# Patient Record
Sex: FEMALE | Race: WHITE
[De-identification: names, ages, dates, MRNs, and addresses within clinical notes are randomized per-mention and may not be internally consistent; named-entity substitution may affect disease eponyms.]

---

## 2017-01-09 NOTE — EDPHY
H & P


Time Seen by Provider: 01/09/17 18:22


HPI/ROS: 


CHIEF COMPLAINT:  Trouble breathing





HISTORY OF PRESENT ILLNESS:  This 36-year-old woman has a history of mast cell 

dysfunction and previous allergic reactions.  She has been feeling unwell since 

last week when she had a friend visit got very excited started getting worse.  

She tells me that sometimes the emotional excitement can make her worse as a 

trigger.  Her Dr. Dinorah José started her on prednisone 3 days ago at 20 mg 

in divided doses.


Today she started feeling bad 4:30 four thirty p.m..  She got malaise 

associated with shortness of breath this abdominal cramping as well as 

sensation of angioedema and feeling flushed under skin.


She got a total of 25 mg oral Benadryl and then 50 mg IV Benadryl by 

paramedics.  At 5:40 a.m. she gave herself her own epinephrine pen because she 

was feeling worse.


Right now she says she feels a little bit flushed but her breathing is better.  

She does not feel entirely back to normal.





REVIEW OF SYSTEMS:


Eye: no change in vision


ENT: no sore throat


Cardiac: no chest pain or syncope


Pulmonary:  HPI


Abdomen:  No vomiting or diarrhea


Musculoskeletal: no back pain


Skin:  Feeling flush but no urticaria


Neuro: no headache


Constitutional: no fever


: no urinary symptoms





A comprehensive 10 point review of systems is otherwise negative aside from 

elements mentioned in the history of present illness.





PAST MEDICAL HISTORY:  Mast cell disease





Social history:  Here with her sister





General Appearance: Alert and conversant, cooperative.


Eyes: No scleral  icterus. 


ENT, Mouth: Normal mucous membranes.  Normal uvula and no visible angioedema.


Respiratory: Normal respiratory effort, breath sounds equal, lungs are clear to 

auscultation. No wheezing.  No stridor, speaks in full sentences.


Cardiovascular:  Regular rate and rhythm.


Gastrointestinal:  Abdomen is soft and non tender.


Neurological: Alert and oriented x3.   Normally conversant. Face symmetric, 

normal movement and sensation in all extremities.


Skin:  Patient states she has some very faint rash on her thighs but I do not 

appreciate any flashing or urticaria.


Musculoskeletal: No peripheral edema and no joint swelling.


Psychiatric: Not agitated.





Emergency Department course/MDM:


Epinephrine and total 75 mg Benadryl prior to arrival.


H2 blocker and methylprednisolone 125 mg IV.  Plan for emergency Department 

observation.





2031:  Feels better, back to normal.  She has had previous rebound but never 

bad enough that she had return to medical care to a hospital.


She lives with family and friends and could return easily if she feels worse.  

She states she feels comfortable being discharged and will be on a increased 

dose of prednisone for the next couple of days and has follow-up with her 

doctor this week on Thursday.





Smoking Status: Never smoked


Constitutional: 


 Initial Vital Signs











Temperature (C)  36.9 C   01/09/17 18:30


 


Heart Rate  125 H  01/09/17 18:30


 


Respiratory Rate  16   01/09/17 18:30


 


Blood Pressure  121/74 H  01/09/17 18:30


 


O2 Sat (%)  93   01/09/17 18:30








 











O2 Delivery Mode               Room Air














Allergies/Adverse Reactions: 


 





acetaminophen [From Vicodin] Allergy (Verified 06/02/16 22:46)


 


aspirin Allergy (Verified 06/02/16 22:46)


 


hydrocodone bitartrate [From Vicodin] Allergy (Verified 06/02/16 22:46)


 


morphine Allergy (Verified 06/02/16 22:46)


 








Home Medications: 














 Medication  Instructions  Recorded


 


Albuterol  01/09/17


 


Mast Cell Stablizer  01/09/17


 


Prednisone  01/09/17


 


Zantac  01/09/17


 


Zyrtec  01/09/17


 


predniSONE [prednisone 20mg (RX)] 40 mg PO DAILY 5 Days 01/09/17














Medical Decision Making


Differential Diagnosis: 


Differential considered including but not limited to food allergy, anaphylaxis, 

mast cell disease, asthma.





- Data Points


Medications Given: 


 








Discontinued Medications





Sodium Chloride (Ns)  1,000 mls @ 0 mls/hr IV ONCE ONE


   PRN Reason: Wide Open


   Stop: 01/09/17 18:34


   Last Admin: 01/09/17 18:40 Dose:  1,000 mls


Sodium Chloride (Ns)  1,000 mls @ 0 mls/hr IV ONCE ONE


   PRN Reason: Wide Open


   Stop: 01/09/17 18:45


   Last Admin: 01/09/17 19:30 Dose:  1,000 mls


Methylprednisolone Sodium Succinate (Solu-Medrol)  125 mg IVP EDNOW ONE


   Stop: 01/09/17 18:34


   Last Admin: 01/09/17 18:40 Dose:  125 mg


Ranitidine HCl (Zantac)  50 mg IVP EDNOW ONE


   Stop: 01/09/17 18:34


   Last Admin: 01/09/17 18:57 Dose:  50 mg








Departure





- Departure


Disposition: Home, Routine, Self-Care


Clinical Impression: 


 Mast cell disease


Condition: Good


Instructions:  Anaphylaxis (ED)


Referrals: 


Dinorah José MD [Medical Doctor] - As per Instructions


Prescriptions: 


predniSONE [prednisone 20mg (RX)] 40 mg PO DAILY 5 Days

## 2017-01-14 NOTE — EDPHY
H & P


Time Seen by Provider: 01/14/17 17:24


HPI/ROS: 


CHIEF COMPLAINT: Mast Cell Disease, Allergic Reaction. 





HISTORY OF PRESENT ILLNESS: This is a 36-year-old female with a history of mast 

cell disease and allergic reactions presenting via EMS for an allergic reaction 

just prior to arrival. She has frequent allergic reactions and has had "major" 

reactions every day for the past week. Her reactions usually include lip 

swelling, flushing, and "blacking out." She had a reaction last night with 

these symptoms along with abdominal cramping and had to use her Albuterol 

inhaler and her symptoms subsided. She then had 2 more reactions today. She 

administered IM Benadryl, IM Epi, and IM Solu-Medrol herself along with PO 

Zantac and Zyrtec. She has also been on 40mg Prednisone for the last 7 days. 

She denies current shortness of breath, angioedema, difficulty swallowing, rash

, or other complaints. She was last seen in the ED 5 days ago for a similar 

reaction. 





REVIEW OF SYSTEMS:


A complete 10-point review of systems was performed and is negative except for 

those items mentioned in the HPI.








Past Medical/Surgical History: 


Mast Cell Dysfunction. 


Social History: 


Here with family. 


Physical Exam: 


General Appearance:  Alert, no distress


Eyes:  Pupils equal and round, no periorbital swelling


ENT, Mouth:  Mucous membranes moist, no oral swelling


Neck:  Normal inspection, no stridor


Respiratory:  Lungs are clear to auscultation, no wheezing


Cardiovascular:  Regular rate and rhythm


Neurological:  A&O, nonfocal, normal gait


Skin:  no rash


Extremities:  No swelling


Psychiatric:  Mood and affect normal





Constitutional: 


 Initial Vital Signs











Temperature (C)  37.2 C   01/14/17 17:31


 


Heart Rate  113 H  01/14/17 17:31


 


Respiratory Rate  18   01/14/17 17:31


 


Blood Pressure  120/72   01/14/17 17:31


 


O2 Sat (%)  99   01/14/17 17:31








 











O2 Delivery Mode               Room Air














Allergies/Adverse Reactions: 


 





acetaminophen [From Vicodin] Allergy (Verified 06/02/16 22:46)


 


aspirin Allergy (Verified 06/02/16 22:46)


 


hydrocodone bitartrate [From Vicodin] Allergy (Verified 06/02/16 22:46)


 


morphine Allergy (Verified 06/02/16 22:46)


 








Home Medications: 














 Medication  Instructions  Recorded


 


Albuterol  01/09/17


 


Mast Cell Stablizer  01/09/17


 


Prednisone  01/09/17


 


Zantac  01/09/17


 


Zyrtec  01/09/17


 


predniSONE [prednisone 20mg (RX)] 40 mg PO DAILY 5 Days 01/09/17


 


predniSONE 1 tab PO DAILY #15 tab 01/14/17














Medical Decision Making


ED Course/Re-evaluation: 


An IV was established. 1L IV saline administered for hydration.  Normal 

physical exam; no objective findings of allergic reaction.





2002: Reassessed patient. She is requesting IV Benadryl and will be given 

12.5mg. 





2019: After Benadryl the patient feels better and wants to go home. She was 

given warnings and return precautions prior to leaving. I answered all of her 

questions. She is comfortable with the plan. 


Differential Diagnosis: 


Differential diagnosis includes though it is not limited to laryngeal edema, 

bronchospasm, hypotension, angioedema.








- Data Points


Laboratory Results: 


 











  01/14/17





  17:30


 


Tryptase  Pending 





  


 


Plasma Histamine  Pending 





  











Medications Given: 


 








Discontinued Medications





Diphenhydramine HCl (Benadryl Injection)  12.5 mg IVP EDNOW ONE


   Stop: 01/14/17 20:03


   Last Admin: 01/14/17 20:20 Dose:  12.5 mg


Sodium Chloride (Ns)  1,000 mls @ 0 mls/hr IV ONCE ONE


   PRN Reason: Wide Open


   Stop: 01/14/17 18:12


   Last Admin: 01/14/17 18:46 Dose:  1,000 mls








Departure





- Departure


Disposition: Home, Routine, Self-Care


Clinical Impression: 


 Mast cell disease, Anaphylaxis


Condition: Good


Instructions:  Anaphylaxis (ED)


Additional Instructions: 


Take 60mg Prednisone daily as instructed. 





Continue to take your normal allergy medications. 





Follow up with Dr. José on Monday for reevaluation. 





Return to the emergency department immediately for difficulty breathing, throat 

swelling, difficulty swallowing, rash, other signs of infection, or other 

serious worsening of condition. 


Referrals: 


Dinorah José MD [Medical Doctor] - As per Instructions


Prescriptions: 


predniSONE 1 tab PO DAILY #15 tab


Report Scribed for: Kellee Julien


Report Scribed by: Royal Oconnor


Date of Report: 01/14/17


Time of Report: 17:33


Physician Review and Approval Statement: 





01/14/17 17:33


Portions of this note were transcribed by a medical scribe.  I personally 

performed a history, physical exam, medical decision making, and confirmed 

accuracy of information the transcribed note.

## 2017-01-17 NOTE — EDPHY
H & P


Stated Complaint: mast cell disease/recurrent allergic reaction/angioedema/seen 

2x this week


Time Seen by Provider: 01/17/17 12:55


HPI/ROS: 


CHIEF COMPLAINT:  " Recurrent anaphylaxis"





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 

syndrome  arrives via private vehicle complaining of recurrent episodes of 

anaphylaxis over the past 1 week.  She was seen emergency department 3 days ago 

for complaints of anaphylaxis, treated at that time and released.  She notes 

that yesterday she experienced 2 episodes of anaphylaxis, treated at home with 

IM Benadryl and IM Solu-Medrol.  She spoke with her  allergy/immunologist today

, Dr. Dinorah José, recommend she go to the ER to receive IV Solu-Medrol and 

IV Benadryl.  Currently states that she is asymptomatic however reports that 

when she develops episodes of anaphylaxis she would become beet reet 

developed tonsillar glossal enlargement, experience dyspnea .  Patient 

requested I speak with Dr. Dinorah José as she had discussed with Dr. Dinorah José possibility of admission





PRIMARY CARE PROVIDER:  primary immunologist/allergist Dr. Dinorah José





REVIEW OF SYSTEMS:


A ten point review of systems was performed and is negative with the exception 

of the items mentioned in the HPI








PAST MEDICAL & SURGICAL  HISTORY:  Mast cell activation syndrome 





SOCIAL HISTORY:   nonsmoker.  Patient works as a nurse, has been on medical 

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  














************


PHYSICAL EXAM





(Prior to examination, patient consented to physical exam, hands were washed 

and my usual and customary physical exam procedures followed)


1) GENERAL: Well-developed, well-nourished, alert and oriented.  Appears to be 

in no acute distress.  Smiling, shakes my hand appears well


2) HEAD: Normocephalic, atraumatic


3) HEENT: Pupils equal, round, reactive to light bilaterally.  Sclera 

anicteric.  Nasopharynx, oropharynx, clear, no lesions. No tonsillar 

enlargement.  Swallowing comfortably Ears bilaterally with normal tympanic 

membranes.


4) NECK: Full range of motion, no meningeal signs.


5) LUNGS: Clear auscultation bilaterally, no wheezes, no rhonchi, no 

retractions.   


6) HEART: Regular rate and rhythm, no murmur, no heave, no gallop.


7) ABDOMEN: No guarding, no rebound, no focal tenderness, negative McBurney's, 

negative Tate's, negative Rovsing's, negative peritoneal sign,


8) MUSCULOSKELETAL: Moving all extremities, no focal areas of tenderness, no 

obvious trauma.  No peripheral edema or discoloration., no midline vertebral 

tenderness, no fluctuance, no step-off, no obvious trauma, no visual or 

palpable abnormality. 


10) SKIN: No rash, no petechiae. Normal coloration.  No urticaria. 


11) Psychiatric:  Patient is oriented X 3, there is no agitation.








***************





DIFFERENTIAL DIAGNOSIS:   in no particular include but limited to anaphylaxis, 

allergic reaction, urticaria








- Personal History


LMP (Females 10-55): 22-28 Days Ago


Current Tetanus/Diphtheria Vaccine: Yes





- Medical/Surgical History


Hx Asthma: No


Hx Chronic Respiratory Disease: No


Hx Diabetes: No


Hx Cardiac Disease: No


Hx Renal Disease: No


Hx Cirrhosis: No


Hx Alcoholism: No


Hx HIV/AIDS: No


Hx Splenectomy or Spleen Trauma: No


Other PMH: postural tachycardia syndrome, mast cell dysfunction, postural 

hypotension





- Social History


Smoking Status: Never smoked


Constitutional: 


 Initial Vital Signs











Temperature (C)  37.1 C   01/17/17 12:49


 


Heart Rate  104 H  01/17/17 12:49


 


Respiratory Rate  20   01/17/17 12:49


 


Blood Pressure  118/83 H  01/17/17 12:49


 


O2 Sat (%)  98   01/17/17 12:49








 











O2 Delivery Mode               Room Air














Allergies/Adverse Reactions: 


 





acetaminophen [From Vicodin] Allergy (Verified 06/02/16 22:46)


 


aspirin Allergy (Verified 06/02/16 22:46)


 


hydrocodone bitartrate [From Vicodin] Allergy (Verified 06/02/16 22:46)


 


morphine Allergy (Verified 06/02/16 22:46)


 








Home Medications: 














 Medication  Instructions  Recorded


 


Albuterol  01/09/17


 


Mast Cell Stablizer  01/09/17


 


Zantac  01/09/17


 


Zyrtec  01/09/17


 


Prednisone  01/17/17














Medical Decision Making


ED Course/Re-evaluation: 


141 pm: phone consultation with Dr Dinorah José.  She informs me that she will 

adjust the patient's prednisone.  Given the patient's current essentially 

asymptomatic status, does not think that admission currently indicated.  

Discussed this with the patient and she is agreeable with this.  Close follow-

up within the next 1-2 days with Dr. Dinorah José.





2:12 p.m.:  Re-evaluation.  Patient is feeling improvement.  Offered admission 

however she is in agreement that she would feel better being discharged.  Plan 

will be close follow-up with Dr. Dinorah José in the next 1-2 days.  Given my 

usual customary strict return precautions, allergic reaction/anaphylaxis 

precautions.  Patient has a sufficient supply of medication at home





- Data Points


Medications Given: 


 








Discontinued Medications





Diphenhydramine HCl (Benadryl Injection)  25 mg IVP EDNOW ONE


   Stop: 01/17/17 13:00


   Last Admin: 01/17/17 13:16 Dose:  25 mg


Famotidine (Pepcid)  20 mg IVP EDNOW ONE


   Stop: 01/17/17 13:01


   Last Admin: 01/17/17 13:18 Dose:  Not Given


Sodium Chloride (Ns)  1,000 mls @ 0 mls/hr IV ONCE ONE


   PRN Reason: Wide Open


   Stop: 01/17/17 13:00


   Last Admin: 01/17/17 13:15 Dose:  1,000 mls


Methylprednisolone Sodium Succinate (Solu-Medrol)  125 mg IVP EDNOW ONE


   Stop: 01/17/17 13:00


   Last Admin: 01/17/17 13:16 Dose:  125 mg








Departure





- Departure


Disposition: Home, Routine, Self-Care


Clinical Impression: 


 Mast cell activation





Allergic reaction


Qualifiers:


 Encounter type: initial encounter Qualifier Code: (T78.40XA) Allergy, 

unspecified, initial encounter


Condition: Good


Instructions:  Anaphylaxis (ED)


Additional Instructions: 


Call 911 if develops shortness of breath, chest pain, abdominal pain, 

difficulty swallowing, difficulty breathing


Referrals: 


Dinorah José MD [Medical Doctor] - 1-2 days without fail

## 2017-01-18 NOTE — EDPHY
H & P


Source: Patient, Family


Exam Limitations: No limitations





- Personal History


Current Tetanus/Diphtheria Vaccine: Unsure


Current Tetanus Diphtheria and Acellular Pertussis (TDAP): Unsure





- Medical/Surgical History


Hx Asthma: No


Hx Chronic Respiratory Disease: No


Hx Diabetes: No


Hx Cardiac Disease: No


Hx Renal Disease: No


Hx Cirrhosis: No


Hx Alcoholism: No


Hx HIV/AIDS: No


Hx Splenectomy or Spleen Trauma: No


Other PMH: postural tachycardia syndrome, mast cell dysfunction, postural 

hypotension





- Social History


Smoking Status: Never smoked


HPI/ROS: 


CHIEF COMPLAINT: Possible Allergic Reaction





HISTORY OF PRESENT ILLNESS: Presents with reports of malaise and possible 

allergic reaction while at her allergy appointment today.  Patient  reports of 

undifferentiated mast cell disorder, for which she sees PCP and allergist.  She 

was at her allergist today when she reports an abnormal sensation, malaise and 

tachycardia when they are infusing Solu-Medrol IV.  She had already received 1 

dose of 60 mg, and a subsequent dose she reports the above.  When EMS arrived 

her heart was mildly tachycardic, which has resolved with IV fluid.  She has no 

chest pain nor has she had any chest pain.  No shortness of breath or 

difficulty breathing.  No rash.  No headache.  She reports generalized malaise 

and feeling palpitations.  Patient has been seen several times for this without 

definitive diagnosis.  No other associated complaints or modifying factors.   

She was seen at time of arrival by EMS.





REVIEW OF SYSTEMS:


Ten systems reviewed and are negative unless otherwise noted in the HPI





EXAMINATION


General Appearance:  Alert, no distress


Head: normocephalic, atraumatic


Eyes:  Pupils equal and round, no conjunctival pallor or injection


ENT, Mouth:  Mucous membranes moist .  Airway is patent.  No edema of the 

pharynx.  No swelling of mouth, lips or tongue.


Neck:  Normal inspection, supple, non-tender


Respiratory:  Lungs are clear to auscultation


Cardiovascular:  Regular rate and rhythm


Gastrointestinal:  Abdomen is soft and nontender


Back: non-tender, no bony abnormalities


Neurological:  A&O, nonfocal,   No tremor.  No pronator drift


Skin:  Warm and dry, no rash


Extremities:  Nontender, no pedal edema


Psychiatric:  Mood and affect normal








DIFFERENTIAL DIAGNOSES:


Including but not limited to mast cell disorder, allergic reaction, conversion 

disorder, chronic fatigue








MDM:


 Reports of possible allergic reaction without any  outward sign of such.  Her 

airway is intact.  There is no shortness of breath or difficulty breathing.  No 

swelling of the mouth, lips or tongue.  No rash of any kind.  She is feeling 

significantly better since EMS was contacted.  She has no complaints at this 

time outside of her baseline.





5:00 p.m.


Given the patient's 4 ER visits in the last 4 days without significant 

improvement in her symptoms. We have elected to pursue admission for further 

care and specialty consultation.  Discussion was had with her allergist/

immunologist Dr. José.  She agrees with this plan.  She recently the patient 

was seen at [facility], and the patient has requested to be transferred 

to [place].  We have initiated transfer and we are waiting to speak with the 

hospitalist on call by IV access Center at this time.  She remains 

hemodynamically stable in no acute distress.  She has no shortness of breath 

her airway complaints. At this time, care has been assumed by Dr. Loaiza. 

Please see his note for final disposition.











SUPERVISION:  Patient was evaluated in conjunction with the supervising 

physician.  Please see their note for details.  (Gagan Obrien)





Constitutional: 





 Initial Vital Signs











Temperature (C)  36.9 C   01/18/17 16:06


 


Heart Rate  78   01/18/17 16:06


 


Respiratory Rate  14   01/18/17 16:06


 


Blood Pressure  122/80 H  01/18/17 16:06


 


O2 Sat (%)  94   01/18/17 16:06








 











O2 Delivery Mode               Room Air

















Allergies/Adverse Reactions: 


 





acetaminophen [From Vicodin] Allergy (Verified 01/18/17 16:06)


 


aspirin Allergy (Verified 01/18/17 16:06)


 


hydrocodone bitartrate [From Vicodin] Allergy (Verified 01/18/17 16:06)


 


morphine Allergy (Verified 01/18/17 16:06)


 








Home Medications: 














 Medication  Instructions  Recorded


 


Albuterol  01/09/17


 


Mast Cell Stablizer  01/09/17


 


Zantac  01/09/17


 


Zyrtec  01/09/17


 


Prednisone  01/17/17














Medical Decision Making


Other Provider: 


I assumed care of the patient waiting authorization for possible transfer to 

[facility].  The patient requested transfer to [facility] for 

tertiary level evaluation.  The referring physician to the emergency department

, Dr. Dinorah José, has also requested this is a possible disposition for the 

patient.  The patient has been accepted for admission by Dr. Altamirano at the 

[facility] for further workup of her complicated medical presentation.

  The patient is stable here in the ED.  She will be transferred via ambulance 

for further care.





 (Bill Loaiza)








Departure





- Departure


Disposition: [facility]


Clinical Impression: 


 Mast cell disease





Allergic reaction


Qualifiers:


 Encounter type: initial encounter Qualifier Code: (T78.40XA) Allergy, 

unspecified, initial encounter


Condition: Good


Referrals: 


Patient,NotPresent [Unknown] - As per Instructions

## 2017-01-21 NOTE — GHP
[f rep st]



                                                            HISTORY AND PHYSICAL





DATE OF ADMISSION:  01/21/2017



CHIEF COMPLAINT:  Anaphylaxis.



HISTORY OF PRESENT ILLNESS:  This is a 36-year-old female who has been seeing Dr. José for possibl
e mast cell activation syndrome.  Her history with this starts about a year ago.  She has episodes of
 flushing, angioedema, tachycardia, abdominal pain, bone pain, subjective fevers.  Initially these we
re thought to be triggered by foods, potentially sounds.  She has actually been quite debilitated fro
m this for the past year.  She previously worked as a nurse, was healthy other than the diagnosis of 
POTS. 



She has been seeing Dr. José, who, as above, has been working her up for mast cell activation synd
[place].  She checked all the mast cell mediators, which were negative.  Over the last few weeks her sym
ptoms have gotten much worse and more severe.  



This Thursday, she was in Dr. José' office receiving IV Benadryl when she had an episode of syncop
e.  At that point she was transferred by ambulance to the [facility] for further evaluation.
  While at the [facility] she received IV Benadryl whenever she felt these symptoms beginning.  This 
seemed to abort these issues.  She tells me she saw an immunology specialist there who had really not
hannah else to add, other than telling her to follow up with Dr. José.  She was discharged last nigh
t feeling okay, and immediately began to decompensate on the ride home.  She was able to make it home
, slept, then today she woke up feeling angioedema, some shortness of breath, gave herself IM Benadry
l and then followed that with epinephrine.  She then took an ambulance to the emergency department.



PAST MEDICAL HISTORY:  

1.  POTS.

2.  Unknown disorder as above.



MEDICATIONS:  Please see medication reconciliation.



ALLERGIES:  Acetaminophen, aspirin, hydrocodone, morphine.



FAMILY HISTORY:  No similar symptoms, though her sisters have allergies.



SOCIAL HISTORY:  She does not drink or smoke.



REVIEW OF SYSTEMS:  A 10-point review of systems is conducted and is negative except per HPI.



PHYSICAL EXAM:  VITAL SIGNS:  Blood pressure is 113/95, heart rate 117, respiratory rate 20, saturati
ng at 96% on room air.  Temperature is 36.5. 

GENERAL:  The patient is a pleasant female who appears comfortable, in no acute distress.  

HEENT:  Shows pupils equal, round, reactive, mucous membranes are moist. Shows no angioedema, she has
 no lymphadenopathy.  

CARDIOVASCULAR:  Shows her to be tachycardic, there are no murmurs, rubs, or gallops.  

PULMONARY:  Shows lungs clear to auscultation bilaterally.  

ABDOMEN:  Soft, nontender, nondistended.  

SKIN:  No rash. 

:  No [place]. 

NEUROLOGIC:  Exam shows her to be alert and oriented x3, she is moving all extremities.  

PSYCHIATRIC:  Shows a normal mood and affect.



LABORATORY DATA:  Labs have not been ordered as of yet.  



I discussed this with Dr. José, we will plan to admit, give her IV Benadryl.  I reviewed her old marcellus liu.



IMPRESSION AND PLAN:  A 36-year-old female who presents with very atypical symptoms, angioedema/anaph
ylaxis.

1.  Symptoms as above:  Still unclear diagnosis.  Certainly seems to fit with mast cell activation sy
ndrome.  Other mimics of this disorder would be things like angioedema, carcinoid, pheochromocytoma, 
VIP tumors, Zollinger-Cordova, potentially thyroid cancer.  We will round out her previous workup by 
getting tryptase, inflammatory markers, KATIA, RF, TSH.  Dr. José will consult either tigre or suhail kirkpatrick with additional thoughts and recommendations.  This case has been referred to see Dr. Roa, scott youngblood is a mast cell specialist.  Tonight, we will treat her with IV Benadryl p.r.n.  I will schedule low
 to moderate dose Solu-Medrol for her.

2.  Tachycardia:  Unclear if this is due to the above syndrome, which I suspect.  I will go ahead and
 order TSH, as well as echocardiogram to evaluate.  

3.  History of POTS:  Unclear exactly how this plays into the entire picture.





Job #:  [id number]

## 2017-01-21 NOTE — ECHO
[id number]

[id number]



+---------------------------------------------------+      [address]

:                                                   :       [place] [address]

:                                                   :           [phone]

+---------------------------------------------------+       Fax [phone]



                       Adult Echocardiographic Report



+----------------------------------------------------------------------------+

:Name: OSITO DISLA DStudy Date: 2017 05:32 PM                        :

:MRN: [id number]      Hospital Admission Number: [id number]                :

:: 1980      Gender: Female                         Height: 62 in   :

:Age: 36 yrs          Race: WH                               Weight: 106 lb  :

:Reason For Study: Eval LV Fx                                                :

:                                                            BSA: 1.5 meters2:

:History: Tachycardia                                                        :

+----------------------------------------------------------------------------+

MMode/2D Measurements & Calculations

IVSd: 0.72 cm   LVIDd: 4.1 cm  FS: 37.5 %            Ao root diam: 2.7 cm

LVPWd: 0.81 cm  LVIDs: 2.6 cm  EDV(Teich): 74.0 ml   ACS: 1.4 cm

                               ESV(Teich): 23.6 ml

                               EF(Teich): 68.1 %



Normal Measurement Values:

+----------------------------------------------------------------------------

----------------------------------+

:LVIDd (3.5-5.7cm)    IVSd (0.6-1.1cm)     LVPWd (0.6-1.1cm)     Aortic Root 

(2.0-3.7cm)Left Atrium (1.5-4.0cm):

:LV Vol(d) (76-115ml) LV Vol(s) (29-48ml)  Ejec Fraction (50-65%)PV Ebnjamin (0.6-

1.2m/s)    TV Benjamin (0.4-1.0m/s)    :

:MV E Benjamin (0.8-1.0m/s)MV A Benjamin (0.3-1.0m/s)LVOT Benjamin (0.7-1.2m/s) Asc Ao Benjamin (

0.9-1.8m/s)                       :

+----------------------------------------------------------------------------

----------------------------------+

Doppler Measurements & Calculations

MV E max benjamin: 61.7 cm/sec  Ao V2 max: 105.9 cm/sec  LV V1 max: 64.2 cm/sec

MV A max benjamin: 54.8 cm/sec  Ao max P.5 mmHg      LV V1 max P.6 mmHg

MV E/A: 1.1



Left Ventricle

The left ventricle is normal in size and function. There is normal left

ventricular wall thickness. The left ventricular ejection fraction is

normal. Ejection Fraction = 69%. The left ventricular wall motion is normal.





Right Ventricle

The right ventricle is normal in size and function.





Atria

The left atrial size is normal. Right atrial size is normal.



Mitral Valve

The mitral valve is normal in structure and function. There is no evidence

of mitral valve prolapse. There is no mitral valve stenosis. There is no

mitral regurgitation noted.



Tricuspid Valve

The tricuspid valve is normal in structure and function. No tricuspid

regurgitation.



Aortic Valve

The aortic valve is normal in structure and function. There is no aortic

stenosis. There is no aortic insufficiency.



Pulmonic Valve

The pulmonic valve is normal in structure and function. There is no pulmonic

valvular regurgitation.





Great Vessels

The aortic root is normal size.



Pericardium/Pleural

Trivial anterior pericardial effusion. Tachycardia.



Conclusion

A complete two-dimensional transthoracic echocardiogram was performed (2D,

M-mode, Doppler and color flow Doppler).

The left ventricle is normal in size and function.

The left ventricular ejection fraction is normal.

Ejection Fraction = 69%.

The left ventricular wall motion is normal.

The right ventricle is normal in size and function.

The left atrial size is normal.

The mitral valve is normal in structure and function.

The tricuspid valve is normal in structure and function.

The aortic valve is normal in structure and function.

Trivial anterior pericardial effusion is present without evidence of

tamponade.





_____________________________________________________________________________





Final Reading Physician:

                        Allegra Edmondson signed on 2017 06:54 PM

Ordering Physician: Bradley Ravi

Performed By: Yanick Montes, RDCS

## 2017-01-21 NOTE — EDPHY
H & P


Time Seen by Provider: 01/21/17 14:01


HPI/ROS: 


CHIEF COMPLAINT:   allergic reaction





HISTORY OF PRESENT ILLNESS:  Patient is a 36-year-old male who has a 

complicated medical history.  For the past year she has been working with Dr. Dinorah José from immunology to figure out why she has been feeling poorly.  

She has been having some weird medical stuff over the past year.  She was 

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  She has 

been having intermittent episodes of hive like an anaphylactoid reactions.  She 

has been treated with intermittent doses of steroids and Benadryl.  She was in 

her physician's office receiving Solu-Medrol when she had a near syncopal 

episode.  She was subsequent transferred to the emergency department.  From 

[facility] she was transferred to [facility].  She was 

admitted there on 01/18/2016.  She underwent 2 days of treatment including 

frequent doses of IV Benadryl.  She was discharged last evening.  The ride 

home was bad.  She began to feel nauseated and had allergic-type reaction.  

She had significant bowel movement upon arriving home.  Her symptoms persisted 

throughout the night and this morning.  Earlier today she felt as though she 

was having swelling in her throat.  She took IM Benadryl which did not 

alleviate her symptoms. At 1:17 p.m. she took epinephrine.  She called EMS was 

transferred to the hospital.





REVIEW OF SYSTEMS:  


My complete review of systems is negative except as mentioned in the HPI.


Past Medical/Surgical History: 


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 

postural hypotension





Past surgical history:  Noncontributory





Social history:  The patient does not smoke.  She denies drugs or alcohol.


Smoking Status: Never smoked


Physical Exam: 


Vitals noted. Blood pressure 132/69, heart rate 119.


GENERAL:  No acute distress, alert.


HEENT:  Eyes normal to inspection, normal pharynx, no signs of dehydration.  

Uvula is midline. No size of erythema or swelling.


NECK:  No thyromegaly, no lymphadenopathy, supple. No stridor


RESPIRATORY:  Clear to auscultation bilaterally, no rales, rhonchi or wheezing.

  Normal


CVS:  Regular rate and rhythm, no rubs, murmurs, or gallops.


ABDOMEN:  Soft, nontender, nondistended, no organomegaly.  Benign


BACK:  Normal to inspection, no CVA tenderness.


SKIN:  Normal color, no rash, warm, dry.  No pallor.


EXTREMITIES:  No pedal edema, no calf tenderness, no Homans sign or cords, no 

joint swelling.


NEURO/PSYCH:  Alert and oriented x3, normal mood and affect, normal motor 

sensory exam.  No obvious cranial nerve deficit.


Constitutional: 





 Initial Vital Signs











Temperature (C)  36.5 C   01/21/17 14:00


 


Heart Rate  119 H  01/21/17 14:00


 


Respiratory Rate  12   01/21/17 14:00


 


Blood Pressure  132/69 H  01/21/17 14:00


 


O2 Sat (%)  100   01/21/17 14:00











Allergies/Adverse Reactions: 


 





acetaminophen [From Vicodin] Allergy (Verified 01/21/17 14:04)


 


aspirin Allergy (Verified 01/21/17 14:04)


 


hydrocodone bitartrate [From Vicodin] Allergy (Verified 01/21/17 14:04)


 


morphine Allergy (Verified 01/21/17 14:04)


 








Home Medications: 














 Medication  Instructions  Recorded


 


Albuterol  01/09/17


 


Zantac  01/09/17


 


Zyrtec  01/09/17


 


Prednisone  01/17/17


 


Im Benadryl  01/21/17














Medical Decision Making


ED Course/Re-evaluation: 


In the emergency department I discussed possible etiologies with the patient.  

I answered all her questions.





I reviewed the patient's previous ER visit from 01/18/2017.





I contacted Dr. Dinorah José.  We discussed possible disposition options and 

plan.  We agreed on admission.  I discussed this with the patient and answered 

all her questions.  Dr. Ravi was consulted for admission.





1455:  The patient is doing well. No signs of respiratory distress or 

anaphylactoid type reaction.


Differential Diagnosis: 


My differential includes but is not limited to mass cell activation syndrome, 

anaphylaxis, anaphylactoid reaction, endocrine disease





- Data Points


Medications Given: 





 








Discontinued Medications





Diphenhydramine HCl (Benadryl Injection)  25 mg IVP EDNOW ONE


   Stop: 01/21/17 14:26


   Last Admin: 01/21/17 14:29 Dose:  25 mg








Departure





- Departure


Disposition: [facility]


Clinical Impression: 


 Mast cell disease





Allergic reaction


Qualifiers:


 Encounter type: subsequent encounter Qualifier Code: (T78.40XD) Allergy, 

unspecified, subsequent encounter


Condition: Good

## 2017-01-21 NOTE — DX
Portable AP chest.



History: Tachycardia. ALLERGIC reaction.



COMPARISON STUDY: July 24, 2006.



Findings: Lungs are clear. Heart size is normal. No infiltrate or pleural effusion.



Moderate thoracic dextroscoliosis.



Impression: Stable negative chest

## 2017-01-22 NOTE — HOSPPROG
Hospitalist Progress Note


Assessment/Plan: 


Recurrent anaphylactic symptoms possibly secondary to mast cell activation 

syndrome - Workup for other etiologies includes nl CRP, neg RF, nl TSH.  See 

Dr. Rojas's notes for other outpt studies related to MCAS workup.  KATIA and 

tryptase pending.  Consider adrenal insufficiency, though she has been on 

stress dose steroids thus an ACTH stim test unlikely to be helpful at this 

time.  I attempted to reach Endocrinology for a consult, but did not hear back.

  Will try again tomorrow.  Appreciate Dr. José' consult and pt also has 

plans to see [facility]AS specialist in the future. 


   -cont current therapies including IV Benadryl, H2 blocker, Solumedrol, to 

which she has responded


   -should she have breakthrough symptoms, obtain a tryptase level and plasma 

histamine level at that time


   -considering initiation of IV benadryl infusion, which could be continued as 

home infusion per Dr. Rojas's recs





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 

could be a trigger for her constellation of symptoms related to her anaphylaxis 


   -will initiate treatment with Flagyl 500 mg BID x7 days





Postural orthostatic tachycardia syndrome - Seen at [facility] for this 

an apparently there are a subset of POTS pts who also suffer from MCAS.  





Full code





DVT PPLX - Lovenox





Dispo - cont inpt


Subjective: Pt feeling much better today.  No anaphylaxis symptoms.  She feels 

benadryl is the most helpful therapy to which she responds.  She feels stronger

, may be able to take a shower.  She notes she hasn't bathed in 2 weeks due to 

ongoing weakness.  No fevers.


Objective: 


 Vital Signs











Temp Pulse Resp BP Pulse Ox


 


 36.7 C   91   14   123/76 H  98 


 


 01/22/17 11:55  01/22/17 11:55  01/22/17 11:55  01/22/17 11:55  01/22/17 11:55








 Laboratory Results





 01/22/17 04:04 





 01/22/17 04:04 





 











 01/21/17 01/22/17 01/23/17





 05:59 05:59 05:59


 


Intake Total  1300 


 


Balance  1300 














- Physical Exam


Constitutional: no apparent distress


Eyes: PERRL


Ears, Nose, Mouth, Throat: moist mucous membranes


Cardiovascular: regular rate and rhythym, no murmur, rub, or gallop


Respiratory: no respiratory distress, clear to auscultation


Gastrointestinal: normoactive bowel sounds, soft, non-tender abdomen


Skin: warm


Neurologic: AAOx3


Psychiatric: interacting appropriately





ICD10 Worksheet


Patient Problems: 


 Problems











Problem Status Diagnosed


 


Allergic reaction Acute 


 


Mast cell disease Acute

## 2017-01-22 NOTE — GCON
[f 
rep st]



                                                                    CONSULTATION





ALLERGY AND IMMUNOLOGY CONSULT





CHIEF COMPLAINT:  Recurrent episodes of anaphylaxis.



HISTORY OF PRESENT ILLNESS:  The patient is a 36-year-old who was admitted 
January 21st for acute worsening of chronic symptom symptoms.  The patient's 
onset of her symptoms date to puberty in 1993 and have waxed and waned but 
progressively worsened over the years.  In 2007, she was diagnosed with 
suspected postural orthostatic tachycardia syndrome, after having a near-
syncopal episode at a classical music concert and was subsequently essentially 
bed-bound for this ensuing 2-3 months.  At that time, she had an extensive 
evaluation with an abnormal tilt-table test.  She had a stress test that 
demonstrated a decrease in blood pressure with prolonged exercise, and this 
decrease in blood pressure correlated with her symptoms of presyncope and 
fatigue.  She was treated with fludrocortisone and salt without improvement in 
symptoms.  She gradually improved, and then was seen in 2011 at [facility] to further investigate the diagnosis of POTS, where the 
diagnosis was confirmed.  



I began seeing her in clinic for suspected mast cell activation syndrome (MCAS) 
at the end of Nov 2016.  The patient began to suspect this diagnosis after her 
consultation with the [facility].  They suspected that 
mast cell activation could be a factor in ther POTS as MCAS disorder has been 
seen in a small subgroup of POTS patients.   Additionally, she has daily 
symptoms of severe fatigue, weakness and malaise.  She is currently essentially 
bed-bound at home and has difficulty performing her activities of daily living 
without assistance from her family.  She has essentially not left her house 
except for physician  visits and ER visits since Thanksgiving of 2015.  She 
reports symptoms or angioedema of face, lips and neck, which review of pictures 
has demonstrated this to be quite severe.  She has sensation of fever and 
chills on most days.  She reports flushing, abdominal pain, sensation of 
cramping, burning, stinging which worsens when she feels like she is having a 
reaction that is often associated with presyncope and weakness, chronic 
abdominal bloating.  She has lost approximately 20 pounds between April and 
September of 2016, and also describes deep burning aching pain in her muscles 
and thighs and difficulty breathing, and episodes of seizure-like activity 
which she reports symptoms of jaw and neck convulsions which seem to occur at 
the peak of her reactions and also symptoms of leg convulsions which occur 
before and right after a reaction.  



Over the past several weeks, the patient has been noting an increase in 
severity in frequency of her reactions.  She was seen in the ER twice last week 
and then, I saw her in clinic for followup on January 18 at which time, she had 
been experiencing recurrent symptoms of anaphylaxis.  Symptoms were blacking out
, sensation of her airway starting to close, flushing and GI cramping.  While 
being seen in the clinic and being given IV Benadryl and IV steroids, she 
developed an episode of altered consciousness for which paramedics were called.
  At that time, her blood pressure increased to approximately to 150 systolic 
and she was tachycardic.  She was then transported to the [facility] ER and then 
subsequently admitted at [facility], where she improved with 
treatment with IV steroids and IV Benadryl.  She was discharged on Friday 
evening, and subsequently worsened following discharge.  After returning home 
she developed symptoms of severe abdominal cramping and had diarrhea, then 
sensation of swelling in her throat.  She self administered her IM Benadryl and 
then she was continuing to have significant symptoms.  She administered her Epi 
pen.  EMS was called and she was transferred to the ER and was subsequently 
admitted.   She currently reported that she was feeling significantly better.  
She reports that she feels that her symptoms do worsen after the IV Benadryl 
and steroids are wearing off.



PAST MEDICAL HISTORY:  

1.  Postural orthostatic tachycardia syndrome. 

2.  History of reported splenomegaly in 1995 with repeat abdominal ultrasound 
demonstrating normal spleen size.  

3.  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



MEDICATION ALLERGIES:  Aspirin, Vicodin.



OUTPATIENT MEDICATIONS:  Zyrtec 10 mg 2-3 times daily, ranitidine 150 mg twice 
daily, ibuprofen 200 mg 3 times daily (this helps with perimenstrual 
exacerbated anaphylactoid symptoms, Gastricorum 200 mg 4 times daily, Chromelin 
20 mg nebulized 4 times daily, albuterol 2.5 mg/3 mL nebulized as needed, 
Benadryl 25-50 mg as needed, and additionally has been on varying doses of 
prednisone related to her ER visit/hospitalizations.



CURRENT MEDICATIONS IN THE HOSPITAL:  Zyrtec 10 mg by mouth 4 times daily, 
diphenhydramine 25 mg IV every 4 hours p.r.n., famotidine 20 mg by mouth twice 
daily, ibuprofen 200 mg every 2 hours as needed and t.i.d. scheduled, 
methylprednisone 40 mg IV every 6 hours, ondansetron p.r.n.



SOCIAL HISTORY:  She has her doctorate in nursing.  She currently resides with 
her sisters and family.



FAMILY HISTORY:  One of her sisters have history of allergic rhinitis and 
episodes of flushing.



REVIEW OF SYSTEMS:  CONSTITUTIONAL:  Positive for fatigue, weakness, malaise, 
20 pound weight loss, fever, chills.  NEUROLOGIC:  Positive for possible seizure
-like activity.  RESPIRATORY:  Notable for difficulty breathing during exercise 
or with her reactions.  CARDIOVASCULAR:  Positive for history of bundle branch 
block and POTS.  GI:  Positive for nausea, vomiting, constipation, diarrhea, 
bloating.  ENDOCRINE:  Negative.  MUSCULOSKELETAL:  Muscle pain in thighs with 
burning and aching.  HEMATOLOGIC:  History of anemia.  ALLERGY/IMMUNOLOGIC:  
Multiple food sensitivities.  DERM: Positive for flushing, urticaria, angioedema
, easy bruising, thinning hair, poor wound healing.



PHYSICAL EXAMINATION:  VITAL SIGNS:  Blood pressure 108/76, heart rate 97, 
respiratory rate 15, 89% on room air, temperature 37.2.  GENERAL:  Resting 
comfortably in bed.  RESPIRATORY:  Normal effort.  No apparent distress.  SKIN:
  Notable for mild erythema in the right arm, in the area where she recently 
had a tourniquet for blood draw.



LABORATORY STUDIES:  Hemoglobin 12.2, hematocrit 35.4.  



Mast cell  results:  Heparin level from February 1 was within normal 
range.  Tryptase level from November 29, 2016, December 12, 2016 and January 4, 2017 were all within normal limits.  Plasma histamine was within normal limits 
January 14, 2017.  Chromogranin A was mildly elevated at 216 on November 29.  
C1 esterase inhibitor levels and functions were normal December 1, 2016.  
Urinary studies for mast cell mediators including N-Methylhistamine, PGD-2, LTE-
4, PGF have also been within normal limits.  



Chest x-ray 01/21/2017 within normal limits.  



Echocardiogram 01/21/2017:  EF of 69%.  Normal mitral, tricuspid, aortic valve 
structure and function.  There was trivial anterior pericardial effusion 
without evidence of tamponade.



IMPRESSION:  A 36-year-old with complicated past medical history, with symptom 
complex that is consistent with mast cell activation syndrome; although it has 
not been officially diagnosed since we have not been able to verify the 
presence of 2 or more elevated absence of mast cell mediators. 



RECOMMENDATIONS:  

1.  For further evaluation, appreciate labs ordered by hospitalist service to 
evaluated for other potential etiologies of her symptoms.   Consider 
consultation with other specialties such as Endocrine and/or Neurology to see 
if they have other thoughts regarding other unifying diagnosis that could be 
evaluated for

2.  If she experiences a significant recurrence of symptoms while in the 
hospital, a tryptase and plasma histamine level would be helpful to have drawn 
at that time to try to clarify a dx of MCAS.  Tissues samples from GI tract 
with  staining for mast cells could also be helpful in clarifying 
diagnosis.

3.  Genetic testing for autoinflammatory condition can be ordered by us as an 
outpatient.  

4.  We will also add on ImmunoCAP to carrots which she has noted to be 
associated recently with some of her episodes of anaphylactic type symptoms, as 
well as her short list of what has been concerned to be safe foods to broaden 
IgE mediated reaction to those. 

5.  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  Wonder if 
it is possible she could be having an immunologic response to this type of 
entamoeba.  Spoke with Dr. Bansal (who did not see the patient officially)  who 
felt that from an infection standpoint it would not be likely to cause 
signficant symptoms or cause systemic symptoms however from the standpoint of 
it being a factor in triggering her anaphylactic symptoms was unsure.  This 
could be treated with flagyl 500mg twice daily for 7 days.

6.  Presuming the diagnosis of MCAS setting her up for home health with 
continuous diphenhydramine infusions at discharge would be maybe a potentially 
helpful therapeutic option,  It would helpful if this could be initiated in the 
hospital.  Typically  a trial of this is done by starting a Benadryl infusion 
at approximately 5 mg/h, and then increasing it 1-2 mg/h every 30-60 minutes to 
a maximum rate of 15 mg/h.  If there is a signficant breakthrough of mast cell 
symptoms.    

7.  Continue with H2 blockade and IV steroids.





Job #:  [id number]

MTDD

## 2017-01-23 NOTE — HOSPPROG
Hospitalist Progress Note


Assessment/Plan: 


Recurrent anaphylactic symptoms possibly secondary to mast cell activation 

syndrome - Workup for other etiologies includes nl CRP, neg RF, nl TSH.  See 

Dr. Rojas's notes for other outpt studies related to MCAS workup.  KATIA and 

tryptase pending.  Considered adrenal insufficiency, though she has been on 

stress dose steroids thus an ACTH stim test unlikely to be helpful at this time 

and her symptoms persist despite high dose steroids.  Trying to get an 

inpatient endocrine consult.  Appreciate Dr. José' consult and pt also has 

plans to see MCAS specialist in the future. 


   -cont current therapies including IV Benadryl, H2 blocker, Solumedrol, to 

which she has responded


   -given increase in symptoms this am (after flagyl), will check plasma 

histamine level now. 


   -tryptase level pending


   -considering initiation of IV benadryl infusion, which could be continued as 

home infusion per Dr. Rojas's recs


   -hoping for endocrine consult today





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 

could be a trigger for her constellation of symptoms related to her anaphylaxis 


   -she has not tolerated flagyl, will d/c





Postural orthostatic tachycardia syndrome - Seen at [facility] for this 

an apparently there are a subset of POTS pts who also suffer from MCAS.  





Full code





DVT PPLX - Lovenox





Dispo - cont inpt


Subjective: Pt reports increase in symptoms this am, abdominal cramping, 

dizziness.  No syncope.  No fevers.  She has not tolerated flagyl.


Objective: 


 Vital Signs











Temp Pulse Resp BP Pulse Ox


 


 36.6 C   73   15   109/69   95 


 


 01/23/17 07:18  01/23/17 07:18  01/23/17 07:18  01/23/17 07:18  01/23/17 07:18








 Laboratory Results





 01/23/17 04:29 





 











 01/22/17 01/23/17 01/24/17





 05:59 05:59 05:59


 


Intake Total  1100 


 


Balance  1100 














- Physical Exam


Constitutional: no apparent distress


Eyes: PERRL


Ears, Nose, Mouth, Throat: moist mucous membranes


Cardiovascular: regular rate and rhythym


Respiratory: no respiratory distress, clear to auscultation


Gastrointestinal: normoactive bowel sounds, soft, non-tender abdomen


Skin: warm


Neurologic: AAOx3


Psychiatric: interacting appropriately





ICD10 Worksheet


Patient Problems: 


 Problems











Problem Status Diagnosed


 


Allergic reaction Acute 


 


Mast cell disease Acute

## 2017-01-24 NOTE — HOSPPROG
Hospitalist Progress Note


Assessment/Plan: 


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 

syndrome per Dr José (allergy/immunology), who follows pt closely in outpt 

setting - Symptoms persist despite IV benadryl q4h, IV solumedrol, H2 blocker.  

Workup for other etiologies includes nl CRP, neg RF, nl TSH.  See Dr. Rojas's 

notes for other outpt studies related to MCAS workup and management (tx is IV 

benadryl infusion).  Considered adrenal insufficiency, but her symptoms persist 

despite high dose IV steroids, essentially ruling this out.  Appreciate Dr. José' consult and pt also has plans to see MCAS specialist in the future.  

Discussed case with Endocrinology, who does not believe this is a primary 

endocrine issue (not a pheo, not thyroid cancer)


   -plan for PICC placement and initiation of continuous benadryl infusion due 

to persistent sx's.  Will start at 2 mg/hr and up-titrate per Dr. José' recs 


   -weaning IV solumedrol, 40 IV BID


   -will change to oral pred tomorrow. 


   -KATIA, plasma histamine and tryptase levels pending


   -endocrine and allergy/immunology consults much appreciated





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 

could be a trigger for her constellation of symptoms related to her anaphylaxis 


   -she did not flagyl, which was d/c'd





Postural orthostatic tachycardia syndrome - Seen at [facility] for this 

an apparently there are a subset of POTS pts who also suffer from MCAS. 





Anxiety - could this be a factor in triggering some of the above symptoms vs a 

result of her symptoms





Full code





DVT PPLX - Lovenox





Dispo - cont inpt


Subjective: Pt reports abdominal cramping, burning and pre-syncopal symptoms 

starting ~3 hrs after IV benadryl and feels she needs the benadryl sooner than 

q4h.  No CP or SOB.  No syncope.  No angioedema.


Objective: 


 Vital Signs











Temp Pulse Resp BP Pulse Ox


 


 36.6 C   75   14   116/78   98 


 


 01/24/17 12:09  01/24/17 12:09  01/24/17 12:09  01/24/17 12:09  01/24/17 12:09








 Laboratory Results





 01/23/17 04:29 





 











 01/23/17 01/24/17 01/25/17





 05:59 05:59 05:59


 


Intake Total 1100 1375 


 


Output Total  800 


 


Balance 1100 575 














- Physical Exam


Constitutional: chronically ill appearing


Eyes: PERRL


Ears, Nose, Mouth, Throat: moist mucous membranes


Cardiovascular: regular rate and rhythym


Respiratory: no respiratory distress, clear to auscultation


Gastrointestinal: normoactive bowel sounds, soft, non-tender abdomen


Skin: warm


Neurologic: AAOx3


Psychiatric: interacting appropriately





ICD10 Worksheet


Patient Problems: 


 Problems











Problem Status Diagnosed


 


Allergic reaction Acute 


 


Mast cell disease Acute

## 2017-01-25 NOTE — DX
Imaging-Guided Peripherally Inserted Central Catheter



History:  Recurrent angioedema.



Prophylactic Antibiotic:  Cefazolin was not ordered and administered for antimicrobial prophylaxis be
cause it was not medically necessary for this procedure.



VTE Prophylaxis:  There is not an order for VTE prophylaxis to be given within 24 hours after procedu
re end time because it was not medically necessary for this procedure.



Crosscutting Measure:  Patient's current list of medications including all known prescriptions, over-
the-counters, herbals, and vitamin/mineral/dietary supplements are reviewed.  Medications' name, dosa
ge, frequency, and route of administration are confirmed.



Technique: Following informed consent, the right arm was prepped and draped in sterile fashion. 1% Xy
locaine was used for local anesthetic.   All elements of maximal sterile barrier technique including 
cap, mask, sterile gown, sterile gloves, large sterile sheet, hand hygiene, and 2% chlorhexidine for 
cutaneous antisepsis, followed. 



Ultrasound evaluation of potential access site was performed. After successfully identifying a patent
 vessel, ultrasound guidance was used to puncture the vein. A permanent recording was created for the
 patient's record.  



Ultrasound transducer was placed in sterile sleeve and used for real-time imaging guidance over steri
le gel to enter the basilic vein. 0.018 measuring wire was passed centrally under fluoroscopic contro
l. A skin nick with scalpel blade was followed by removing the access needle. A 4 Azerbaijani peel-away sh
eath was followed by a 4 Azerbaijani single-lumen central catheter, trimmed to 38 cm length. .  The tip of
 the catheter was positioned centrally and the guidewire removed. A single fluoroscopic spot image wa
s obtained in inspiration. The hub of the catheter was fixed to the skin using a sterile StatLock adh
esive device, and a sterile dressing was applied. The catheter was irrigated.



Findings: The tip of the central catheter terminates at the junction of the superior vena cava and th
e right atrium.



Fluoroscopy:  0.1 minutes, 1 images



Impression: 4 Azerbaijani single lumen peripherally inserted central catheter is ready to use.

## 2017-01-25 NOTE — HOSPPROG
Hospitalist Progress Note


Assessment/Plan: 


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 

syndrome per Dr José (allergy/immunology), who follows pt closely in outpt 

setting - Symptoms persist despite IV benadryl q4h, IV solumedrol, H2 blocker.  

Workup for other etiologies includes nl CRP, neg RF, nl TSH.  See Dr. Rojas's 

notes for other outpt studies related to MCAS workup and management (tx is IV 

benadryl infusion).  Considered adrenal insufficiency, but her symptoms persist 

despite high dose IV steroids, essentially ruling this out.  Appreciate Dr. José' consult and pt also has plans to see MCAS specialist in the future.  

We discussed case with Endocrinology, who does not believe this is a primary 

endocrine issue (not a pheo, not thyroid cancer)


   -Start Benadryl infusion as ordered.


   -Steroids changed to Prednisone today


   -KATIA, plasma histamine and tryptase levels pending


   -endocrine and allergy/immunology consults much appreciated





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 

could be a trigger for her constellation of symptoms related to her anaphylaxis 


   -Was treated with Flagyl, but this exacerbated the sx's and has been d/c'd





Postural orthostatic tachycardia syndrome - Seen at [facility] for this 

an apparently there are a subset of POTS pts who also suffer from MCAS. 





Anxiety - could this be a factor in triggering some of the above symptoms vs a 

result of her symptoms





Full code





DVT PPLX - Lovenox





Dispo - cont inpt





Subjective: 


The pt reports feeling better today. Less cramping. 


PICC placed yesterday, order for Benadryl drip, but has not been started. Has 

been getting PRN IV Benadryl


No CP or SOB.  No syncope.  No angioedema.





Objective: 


VSS


- Physical Exam


Constitutional: chronically ill appearing


Eyes: PERRL


Ears, Nose, Mouth, Throat: moist mucous membranes


Cardiovascular: regular rate and rhythym


Respiratory: no respiratory distress, clear to auscultation


Gastrointestinal: normoactive bowel sounds, soft, non-tender abdomen


Skin: warm


Neurologic: AAOx3


Psychiatric: interacting appropriately





Objective: 


 Vital Signs











Temp Pulse Resp BP Pulse Ox


 


 36.9 C   73   16   112/71   97 


 


 01/25/17 12:16  01/25/17 12:16  01/25/17 12:16  01/25/17 12:16  01/25/17 12:16








 Laboratory Results





 01/23/17 04:29 





 











 01/24/17 01/25/17 01/26/17





 05:59 05:59 05:59


 


Intake Total 1375 2500 


 


Output Total 800 800 


 


Balance 575 1700 














ICD10 Worksheet


Patient Problems: 


 Problems











Problem Status Diagnosed


 


Allergic reaction Acute 


 


Mast cell disease Acute

## 2017-01-25 NOTE — PDCONSULT
Consultant Note: 





      ALLERGY CONSULT NOTE








Subjective:


Bindu reports feeling better overall.  She feels that her reactions over the 

past 2 days may be related to her exposure to flagyl and what she refers to as 

"aftershock" reactions from the first one.  She was just change to 5mg/hour 

benadryl infusion at the time of seeing her and she was begining to have the 

sensation of boiling water on her chest, increase in malaise.





Objective:  


Gen: pale, NAD


Neuro: AxO x 3 but somewhat slow reacall.





Assessment:


1)Suspected Mast Cell Activation Disorder 


2)POTs





-continue to titrate IV benadryl infusion, many patients do not improve until 

they reach around 10mg/hour, max rate 15mg/hr


-ok to provide IV bolus of  25mg of benadryl if needed for break through 

symptoms which may occur during titration


-continue with steroid taper, splitting doses of prednisone may be helpful to 

maintain more stable levels


-checking tryptase, plasma histamine and chromagranin levels within 3 hours of 

an acute worsening would be helpful to document mast cell activity (tryptase 

and histamine levels must be kept on ice and centrifuged in a refrigerated 

centrifuge to be ensure accurate results.




















WBC  7.51 10^3/uL (3.80-9.50)   01/22/17  04:04    


 


RBC  4.19 10^6/uL (4.18-5.33)   01/22/17  04:04    


 


Hgb  12.2 g/dL (12.6-16.3)  L  01/22/17  04:04    


 


Hct  35.4 % (38.0-47.0)  L  01/22/17  04:04    


 


MCV  84.5 fL (81.5-99.8)   01/22/17  04:04    


 


MCH  29.1 pg (27.9-34.1)   01/22/17  04:04    


 


MCHC  34.5 g/dL (32.4-36.7)   01/22/17  04:04    


 


RDW  12.1 % (11.5-15.2)   01/22/17  04:04    


 


Plt Count  327 10^3/uL (150-400)   01/22/17  04:04    


 


MPV  9.4 fL (8.7-11.7)   01/22/17  04:04    


 


Neut % (Auto)  91.1 % (39.3-74.2)  H  01/22/17  04:04    


 


Lymph % (Auto)  6.3 % (15.0-45.0)  L  01/22/17  04:04    


 


Mono % (Auto)  1.9 % (4.5-13.0)  L  01/22/17  04:04    


 


Eos % (Auto)  0.0 % (0.6-7.6)  L  01/22/17  04:04    


 


Baso % (Auto)  0.0 % (0.3-1.7)  L  01/22/17  04:04    


 


Nucleat RBC Rel Count  0.0 % (0.0-0.2)   01/22/17  04:04    


 


Absolute Neuts (auto)  6.85 10^3/uL (1.70-6.50)  H  01/22/17  04:04    


 


Absolute Lymphs (auto)  0.47 10^3/uL (1.00-3.00)  L  01/22/17  04:04    


 


Absolute Monos (auto)  0.14 10^3/uL (0.30-0.80)  L  01/22/17  04:04    


 


Absolute Eos (auto)  0.00 10^3/uL (0.03-0.40)  L  01/22/17  04:04    


 


Absolute Basos (auto)  0.00 10^3/uL (0.02-0.10)  L  01/22/17  04:04    


 


Absolute Nucleated RBC  0.00 10^3/uL (0-0.01)   01/22/17  04:04    


 


Immature Gran %  0.7 % (0.0-1.1)   01/22/17  04:04    


 


Immature Gran #  0.05 10^3/uL (0.00-0.10)   01/22/17  04:04    


 


ESR  8 MM/HR (0-20)   01/21/17  18:36    


 


Sodium  137 mEq/L (134-144)   01/23/17  04:29    


 


Potassium  4.0 mEq/L (3.5-5.2)   01/23/17  04:29    


 


Chloride  108 mEq/L ()   01/23/17  04:29    


 


Carbon Dioxide  23 mEq/l (22-31)   01/23/17  04:29    


 


Anion Gap  6 mEq/L (8-16)   01/23/17  04:29    


 


BUN  19 mg/dL (7-23)   01/23/17  04:29    


 


Creatinine  0.6 mg/dL (0.6-1.0)   01/23/17  04:29    


 


Estimated GFR  > 60   01/23/17  04:29    


 


Glucose  114 mg/dL ()  H  01/23/17  04:29    


 


Calcium  8.5 mg/dL (8.5-10.4)   01/23/17  04:29    


 


Total Bilirubin  1.0 mg/dL (0.1-1.4)   01/21/17  18:36    


 


AST  25 IU/L (14-46)   01/21/17  18:36    


 


ALT  29 IU/L (9-52)   01/21/17  18:36    


 


Alkaline Phosphatase  42 IU/L ()   01/21/17  18:36    


 


Troponin I  0.022 ng/mL (0-0.034)   01/21/17  18:36    


 


C-Reactive Protein  < 5.0 mg/L (<10.0)   01/21/17  18:36    


 


Total Protein  7.1 g/dL (6.3-8.2)   01/21/17  18:36    


 


Albumin  4.0 g/dL (3.5-5.0)   01/21/17  18:36    


 


Tryptase  1.7 ng/mL (<11.5)   01/21/17  18:36    


 


TSH  2.460 uIU/mL (0.465-4.680)   01/21/17  18:36    


 


Carrot Allergen IgE Ab  < 0.35 kUA/L (<0.35)   01/22/17  04:29    


 


Rheum Factor Semi-Quant  < 8.6 IU/mL (<12.0)   01/21/17  18:36    


 


KATIA Screen  0.20 UNITS (<1.00)   01/21/17  18:36

## 2017-01-26 NOTE — HOSPPROG
Hospitalist Progress Note


Assessment/Plan: 


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 

syndrome per Dr José (allergy/immunology), who follows pt closely in outpt 

setting - Symptoms persist despite IV benadryl q4h, IV solumedrol, H2 blocker.  

Workup for other etiologies includes nl CRP, neg RF, nl TSH.  See Dr. Rojas's 

notes for other outpt studies related to MCAS workup and management (tx is IV 

benadryl infusion).  Considered adrenal insufficiency, but her symptoms persist 

despite high dose IV steroids, essentially ruling this out.  Appreciate Dr. José' consult and pt also has plans to see MCAS specialist in the future.  

We discussed case with Endocrinology, who do not believe this is a primary 

endocrine issue (not a pheo, not thyroid cancer)


   -Cont Benadryl infusion as ordered.


   -Steroids changed to Prednisone 1/25


   -KATIA, plasma histamine and tryptase levels pending


   -endocrine and allergy/immunology consults much appreciated


   -Await further reccs





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 

could be a trigger for her constellation of symptoms related to her anaphylaxis 


   -Was treated with Flagyl, but this exacerbated the sx's and has been d/c'd





Postural orthostatic tachycardia syndrome - Seen at [facility] for this 

an apparently there are a subset of POTS pts who also suffer from MCAS. 





Anxiety - could this be a factor in triggering some of the above symptoms vs a 

result of her symptoms





Full code





DVT PPLX - Lovenox





Dispo - cont inpt





Subjective: 


The pt felt better until she showered


she is on a Benadryl drip at 13mg / hr


she is needing Benadryl 25mg IV for breakthrough sx's


No CP or SOB.  No syncope.  No angioedema.





Objective: 


VSS


- Physical Exam


Constitutional: chronically ill appearing


Eyes: PERRL


Ears, Nose, Mouth, Throat: moist mucous membranes


Cardiovascular: regular rate and rhythym


Respiratory: no respiratory distress, clear to auscultation


Gastrointestinal: normoactive bowel sounds, soft, non-tender abdomen


Skin: warm


Neurologic: AAOx3


Psychiatric: interacting appropriately





Objective: 


 Vital Signs











Temp Pulse Resp BP Pulse Ox


 


 37.3 C   62   16   105/78   94 


 


 01/26/17 16:14  01/26/17 16:14  01/26/17 16:14  01/26/17 16:14  01/26/17 16:14








 Laboratory Results





 01/23/17 04:29 





 











 01/25/17 01/26/17 01/27/17





 05:59 05:59 05:59


 


Intake Total 2500 1978.6 788


 


Output Total 800  


 


Balance 1700 1978.6 788














ICD10 Worksheet


Patient Problems: 


 Problems











Problem Status Diagnosed


 


Allergic reaction Acute 


 


Mast cell disease Acute

## 2017-01-27 NOTE — PDCONSULT
Consultant Note: 


Allergy Consult Note- Phone Call





Spoke with sister Venecia on the phone this morning.  Last night around 3am 

Kanchan woke up with the sensation of "bees in her throat/stomach and needed a 

benaryl bolus.  She is currently on an infusion of 14mg/hour.   Per venecia 

kanchan feels this has been helpful in decreasing the severity of her reactions 

but they are still present. Discussed the steroid dosing and there has been 

increased breakthrough early am symptoms since changing from  medrol 40mg IV 

tid with evening dose being given at 10pm.  





The steroid dose/timing could be related to the early morning break through 

symptoms and reported circadian variations in mast cell activity.  Consider 

changing to dosing the prednisone every 8 hours with up dosing at night instead 

of twice daily such as Prednisone at 30mg at 11pm, 20mg at 7am and 20mg at 3pm. 





Other therapeutic considerations would be adding back her oral cromolyn prior 

to meals but there has been some question if she tolerated that in the past so 

will hold off on that for now. 





I will also order tryptase, histamine and chromagranin levels as well as 24 

hour urine for mast cell mediators as her plasma histamine came back normal 

although it apparently had not been transported on ice and this is very 

important for the stability of mast cell mediators.  Documentation of the 

mediators being elevated is important for the diagnosis.


Will also check anca's as they have not been checked, arminda negative and urinary 

porphyrins to further eval another etiology of her symptoms that could share 

similar presentation to the suspected MCAS.   When stable it would be helpful 

to have her see gastroentrology for endoscopy to see if we can document 

increased mast cells on biopsy and consider bone marrow bx with heme.





Will be by later today to see Kanchan.

## 2017-01-27 NOTE — HOSPPROG
Hospitalist Progress Note


Assessment/Plan: 


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 

syndrome per Dr José (allergy/immunology), who follows pt closely in outpt 

setting - Symptoms persist despite IV benadryl q4h, IV solumedrol, H2 blocker.  

Workup for other etiologies includes nl CRP, neg RF, nl TSH.  See Dr. Rojas's 

notes for other outpt studies related to MCAS workup and management (tx is IV 

benadryl infusion).  Considered adrenal insufficiency, but her symptoms persist 

despite high dose IV steroids, essentially ruling this out.  Appreciate Dr. José' consult and pt also has plans to see MCAS specialist in the future.  

We discussed case with Endocrinology, who do not believe this is a primary 

endocrine issue (not a pheo, not thyroid cancer)


   -Cont Benadryl infusion as ordered.


   -Change Prednisone to TID, 30mg 11 pm, 20mg 0700, 20 mg 1500


   -KATIA, plasma histamine and tryptase levels normal


   -endocrine and allergy/immunology consults much appreciated


   -Await further reccs





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 

could be a trigger for her constellation of symptoms related to her anaphylaxis 


   -Was treated with Flagyl, but this exacerbated the sx's and has been d/c'd





Postural orthostatic tachycardia syndrome - Seen at [facility] for this 

an apparently there are a subset of POTS pts who also suffer from MCAS. 





Anxiety - could this be a factor in triggering some of the above symptoms vs a 

result of her symptoms





Full code





DVT PPLX - Lovenox





Dispo - cont inpt





PLAN:


Await further reccs


if she is symptomatic again, check Tryptase as close to her symptoms as 

possible.





Subjective: 


Still with symptoms "bees in her throat" overnight. Now very fatigued


she is on a Benadryl drip at 14mg / hr


she is needing Benadryl 25mg IV for breakthrough sx's


No CP or SOB.  No syncope.  No angioedema.





Objective: 


VSS


- Physical Exam


Constitutional: chronically ill appearing


Eyes: PERRL


Ears, Nose, Mouth, Throat: moist mucous membranes


Cardiovascular: regular rate and rhythym


Respiratory: no respiratory distress, clear to auscultation


Gastrointestinal: normoactive bowel sounds, soft, non-tender abdomen


Skin: warm


Neurologic: AAOx3


Psychiatric: interacting appropriately





Objective: 


 Vital Signs











Temp Pulse Resp BP Pulse Ox


 


 36.9 C   60   16   127/81 H  96 


 


 01/27/17 08:00  01/27/17 08:00  01/27/17 08:00  01/27/17 08:00  01/27/17 08:00








 Laboratory Results





 01/27/17 06:07 





 01/27/17 06:07 





 











 01/26/17 01/27/17 01/28/17





 05:59 05:59 05:59


 


Intake Total 1978.6 1788 


 


Balance 1978.6 1788 














ICD10 Worksheet


Patient Problems: 


 Problems











Problem Status Diagnosed


 


Allergic reaction Acute 


 


Mast cell disease Acute

## 2017-01-28 NOTE — HOSPPROG
Hospitalist Progress Note


Assessment/Plan: 


DIAGNOSIS: 


-Ongoing symptoms of anaphylaxis related to suspected mast cell activation 

syndrome





PLANS:


- at this point will continue at the new higher dose of IV Benadryl 15 

milligrams/hour, with intermittent boluses, and make sure that she tolerates 

this safely without anticholinergic effects


-Will discuss with Dr. Dinorah José


-Will review with discharge planning to be certain that we are making progress 

in getting her set up for home therapy with IV Benadryl





SUBJECTIVE:


 the patient does feel significantly better than yesterday afternoon, still 

intermittently has abrupt symptoms of burning discomfort in the chest back 

abdomen.  She has not had wheezing or shortness of breath or symptoms or 

findings of hypotension





OBJECTIVE


Vitals reviewed: all normal without fever





Exam:


alert oriented 


skin warm dry color ok


resps not labored


lungs clear BSs


heart regular


abd soft nondistended nontender, bowel sounds present


limbs warm, no edema


iv site ok





 no new lab test results today with several tests still pending


Objective: 


 Vital Signs











Temp Pulse Resp BP Pulse Ox


 


 36.6 C   74   16   114/79   97 


 


 01/28/17 08:00  01/28/17 08:00  01/28/17 08:00  01/28/17 08:00  01/28/17 08:00








 Laboratory Results





 01/27/17 06:07 





 01/27/17 06:07 





 











 01/27/17 01/28/17 01/29/17





 06:59 06:59 06:59


 


Intake Total 1788  


 


Balance 1788  














ICD10 Worksheet


Patient Problems: 


 Problems











Problem Status Diagnosed


 


Allergic reaction Acute 


 


Mast cell disease Acute

## 2017-01-28 NOTE — PDCONSULT
Consultant Note: 


Allergy Consult Note





Saw Bindu in hospital yesterday at which time she reported feeling that IV 

bendaryl was helping at the 14mg per hour dose in terms of severity of reaction 

but she was still having abdominal pain with eating.  Talked to Venecia today 

on phone, Bindu needed a bolus late nite of Benadryl 25mg after she was exposed 

to fragrance and had sensation of burning in GI tract.  This am had sensation 

of "bees in her belly" after breakfast.  Benadryl dose was increased to 15mg/

hour b/t breakfast and lunch and had no pain with lunch (flax pancakes) which 

is the first time she has not had pain with eating in at least 3 weeks.  Some 

flushing this am but none since increased dose of benadryl.  No SOB.  Able to 

get up and go to bathroom where as yesterday had to use bedside commode.  No 

3am awakening last night and need for benadrylbolus as she has needed on 

previous nights.





 


A/P


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


-improved early morning symptoms since yesterday and changing steroid dosing to 

thre with further improvement since increasing benadryl infusion to 15mg/hour


-would continue with present three times daily prednisone dosing as that does 

seem to have alleviated the 3am symptoms


-follow up on pending mast cell  labs, tryptase and plasma heparin 

levels were normal 


-Assuming she continues to do well and symptoms continue to improve/stabilize 

she could go home with home health infusion of the benadryl at 15mg/hr 


-Will reeval tomorrow

## 2017-01-29 NOTE — PDCONSULT
Consultant Note: 


Allergy Follow up note





Bindu did well yesterday, ate lunch w/o sx, mild gi sx with green peas last 

night for dinner.  Overnight Benadryl drip did not infuse for several hours, 

unclear for exactly how long.  This am required benadryl bolus and solumedrol. 





Benadryl drip has been resumed at 15mg/hour


Agree with plan to continue this for now, treat break through symptoms with 

benadryl bolus as needed


Continue present prednisone dose


Would be helpful to see how she does after 24 hour of consistent therapy

## 2017-01-29 NOTE — HOSPPROG
Hospitalist Progress Note


Assessment/Plan: 


This patient with chronic frequently recurrent anaphylactic like symptoms as 

well as other symptoms is suspected to have mast cell activation syndrome, and 

she comes in for control of the symptoms and titration of IV medication with 

continuous Benadryl drip.





DIAGNOSIS: 


-Ongoing symptoms of anaphylaxis related to suspected mast cell activation 

syndrome





At this time she appears to be having reasonably good response to therapy 

although she does continue to have intermittent breakthrough symptoms.  Some of 

these symptoms could potentially be due to lapse in treatment as for example 

this morning when her pump was turned off for a period.  I will review further 

with Dr. José later today, but for now will continue to treat her 

aggressively here.





PLANS:


- at this point will continue at the new higher dose of IV Benadryl 15 

milligrams/hour, with intermittent boluses, and make sure that she tolerates 

this safely without anticholinergic effects


-we may need to consider giving her extra boluses before meals; also if she has 

a lapse in the continuous drip treatment she may need to bolus to get herself 

back to steady state levels in addition to resuming the drip.


-Will review with discharge planning to be certain that we are making progress 

in getting her set up for home therapy with IV Benadryl





SUBJECTIVE:


 Overall the patient has noticed significant improvement in her symptoms since 

increasing Benadryl 15 per hour and with the change in the steroid therapy to 

three times daily dosing.  However around 2 o'clock this morning there appears 

to have been a lapse in the administration of her IV Benadryl for perhaps 3 

hours although the timing of these events is unclear.  She did okay until she 

ate breakfast this morning when she began to have significant tachycardia 

associated with some burning discomfort in truncal areas.  Initially she 

received a bolus of 25 mg Benadryl which led to some improvement in the 

symptoms and heart rate, however subsequently she began to feel worsening 

symptoms and have faster heart rate requiring further bolusing of steroid and 

Benadryl.  It is unclear what role the laps in Benadryl drip had on her 

symptoms this morning as she started back on the drip a few hours before the 

symptoms started, however it is unclear how long it takes her to get back to 

steady state.





OBJECTIVE


Vitals reviewed:  She has been fairly tachycardic this morning, see above; 

otherwise stable vitals





Exam:


alert oriented 


skin warm dry color ok, I do not see hives


resps not labored


lungs clear BSs


heart regular


abd soft nondistended nontender, bowel sounds present


limbs warm, no edema


iv site ok





 no new lab test results today with several tests still pending


Objective: 


 Vital Signs











Temp Pulse Resp BP Pulse Ox


 


 36.6 C   113 H  16   101/67   99 


 


 01/29/17 08:00  01/29/17 08:00  01/29/17 08:00  01/29/17 08:00  01/29/17 08:00








 Laboratory Results





 01/27/17 06:07 





 01/27/17 06:07 





 











 01/28/17 01/29/17 01/30/17





 06:59 06:59 06:59


 


Intake Total  1946.4 


 


Balance  1946.4 














ICD10 Worksheet


Patient Problems: 


 Problems











Problem Status Diagnosed


 


Allergic reaction Acute 


 


Mast cell disease Acute

## 2017-01-30 NOTE — HOSPPROG
Hospitalist Progress Note


Assessment/Plan: 


This is a 36-year-old female with suspected mast cell activation syndrome with





 # report recurrent/ongoing symptoms of anaphylaxis requiring continuous IV 

Benadryl infusion


   - I discussed case with her allergist Dr. José who recommends that we 

continue IV Benadryl   At this time.  Will also trial Xanax to see if this 

helps prior to providing IV push Benadryl in the off chance that her symptoms 

are possibly exacerbated by anxiety.





  Disposition:   I offered the patient transfer to a tertiary care center such 

as [place] where she has been treated for before which the patient refused.  

Will continue IV Benadryl as ordered by Dr. José in hopes of accomplishing 

control of her symptoms.  For now the plan is to discharge her home on IV 

Benadryl once her symptoms are stable.


Subjective: Patient reports 2 episodes overnight where she became lethargic 

with crampy abdominal pain and flushing.  She denies any shortness of breath or 

wheezing.


Objective: 


 Vital Signs











Temp Pulse Resp BP Pulse Ox


 


 36.6 C   97   14   117/85 H  95 


 


 01/29/17 23:09  01/29/17 23:09  01/29/17 23:09  01/29/17 23:09  01/29/17 23:09








 Laboratory Results





 01/27/17 06:07 





 01/27/17 06:07 





 











 01/29/17 01/30/17 01/31/17





 05:59 05:59 05:59


 


Intake Total 1946.4 2988 


 


Output Total  600 900


 


Balance 1946.4 1888 -900














- Physical Exam


Constitutional: no apparent distress, appears nourished, not in pain


Ears, Nose, Mouth, Throat: moist mucous membranes, hearing normal, ears appear 

normal, no oral mucosal ulcers


Cardiovascular: regular rate and rhythym, no murmur, rub, or gallop


Respiratory: no respiratory distress, no rales or rhonchi, clear to auscultation


Skin: no rashes or abrasions, no fluctuance, no induration





ICD10 Worksheet


Patient Problems: 


 Problems











Problem Status Diagnosed


 


Allergic reaction Acute 


 


Mast cell disease Acute

## 2017-01-31 NOTE — PDIAF
- Diagnosis


Diagnosis: mast cell activation syndrome


Code Status: Full Code





- Medication Management


Discharge Medications: 


 Medications to Continue on Transfer





Albuterol [Proventil Neb] 3 ml IH Q4HRS PRN 01/09/17 [Last Taken 01/14/17]


Cetirizine HCl [Zyrtec] 10 mg PO QID 01/09/17 [Last Taken 01/21/17 10:50]


Ranitidine HCl [Zantac] 150 mg PO BID 01/09/17 [Last Taken 01/21/17 10:50]


predniSONE 20 mg PO TID 01/17/17 [Last Taken 01/21/17 10:50]


EPINEPHRINE [EPIPEN] 0.3 mg IM PRN PRN 01/21/17 [Last Taken 01/21/17 13:15]


Ibuprofen [Motrin (*)] 200 mg PO Q2HRS PRN 01/21/17 [Last Taken Unknown]


Ibuprofen [Motrin (*)] 200 mg PO TID 01/21/17 [Last Taken 01/21/17 10:50]


diphenhydrAMINE [Benadryl Injection] 25 - 50 mg IM Q6 PRN 01/21/17 [Last Taken 

01/21/17]


diphenhydrAMINE [Benadryl Injection] 25 mg IVP Q4HRS PRN #0 inj 01/31/17 [Last 

Taken Unknown]


diphenhydrAMINE [Benadryl Injection] 300 mg IV CONT #0 inj 01/31/17 [Last Taken 

Unknown]








Discharge Medications: Refer to the Discharge Home Medication list for PRN 

reason.





- Orders


Services needed: Home Care, Registered Nurse


Home Care Face to Face: I certify that this patient was under my care and that 

I had the required face-to-face encounter meeting the encounter requirements on 

the discharge day.  My findings support the fact that the patient is homebound 

as defined in CMS Chapter 7 Medicare Benefits Manual 30.1.1, The condition of 

the patient is such that there exists a normal inability to leave home and 

consequently, leaving home would require a considerable and taxing effort.


Diet Recommendation: no restrictions on diet


Diet Texture: Regular Texture Diet





- Follow Up Care


Current Providers and Referrals: 


IN STATE,. [Primary Care Provider] - As per Instructions

## 2017-01-31 NOTE — PDIAF
- Diagnosis


Diagnosis: mast cell activation syndrome


Code Status: Full Code





- Medication Management


Discharge Medications: 


 Medications to Continue on Transfer





Albuterol [Proventil Neb] 3 ml IH Q4HRS PRN 01/09/17 [Last Taken 01/14/17]


Cetirizine HCl [Zyrtec] 10 mg PO QID 01/09/17 [Last Taken 01/21/17 10:50]


Ranitidine HCl [Zantac] 150 mg PO BID 01/09/17 [Last Taken 01/21/17 10:50]


predniSONE 20 mg PO TID 01/17/17 [Last Taken 01/21/17 10:50]


EPINEPHRINE [EPIPEN] 0.3 mg IM PRN PRN 01/21/17 [Last Taken 01/21/17 13:15]


Ibuprofen [Motrin (*)] 200 mg PO Q2HRS PRN 01/21/17 [Last Taken Unknown]


Ibuprofen [Motrin (*)] 200 mg PO TID 01/21/17 [Last Taken 01/21/17 10:50]


diphenhydrAMINE [Benadryl Injection] 25 - 50 mg IM Q6 PRN 01/21/17 [Last Taken 

01/21/17]


diphenhydrAMINE [Benadryl Injection] 300 mg IV CONT #0 inj 01/31/17 [Last Taken 

Unknown]








Discharge Medications: Refer to the Discharge Home Medication list for PRN 

reason.





- Orders


Services needed: Registered Nurse


Diet Recommendation: no restrictions on diet


Diet Texture: Regular Texture Diet





- Follow Up Care


Current Providers and Referrals: 


[place],. [Primary Care Provider] - As per Instructions

## 2017-01-31 NOTE — PDCONSULT
Consultant Note: 


Allergy Consult Note- Phone Call Documentation





Spoke with Venecia this am.  Bindu had 1 bolus of benadryl last niht 25mg which 

she responded well to. Overall they feel comfortable with transition home at 

this point in time.





Reviewed Vital Signs and medication Adminstration


Labs: mediators are pending





Discussed case with Dr. Yehuda Rivera who is [facility] specialists (he did not see the 

patient) who felt that the increase in her symptoms with interruption in the 

benadryl infusion and her symptomatology is fairly typical for what he sees 

with his [facility]AS patients.  Note Bindu has an appt set with him for next August.





Recommend setting them up for discharge with home health and infusion of 

Benadryl at 14mg/hr with bolus capability on the pump.  We will follow up with 

her closely as outpatient.





Reference


Utility of Continuous Diphenhydramine Infusion in Severe Mast Cell Activation 

Syndrome.  William Rivera. Blood 2015 [id number]

## 2017-01-31 NOTE — GDS
[f rep st]



                                                             DISCHARGE SUMMARY





DISCHARGE DIAGNOSIS:  Episodic recurrent bouts of abdominal cramping, flushing, and malaise, possibly
 due to mast cell activation syndrome.



CONSULTANTS:  Dr. Dinorah José, Allergy/Immunology.



HOSPITAL COURSE AND STAY BY PROBLEM:  Recurrent bouts of abdominal cramping, flushing, and malaise:  
Patient was admitted to the hospital for treatment of possible mast cell activation syndrome.  She ha
s been treated with continuous infusion of IV Benadryl, as well as IV Benadryl for breakthrough sympt
oms.  The patient's hospital course was prolonged due to recurrent bouts of symptoms.  Patient was se
en by her allergist, Dr. Dinorah José, who ultimately recommended that the patient be discharged on 
a continuous IV Benadryl infusion. 



On day of discharge, the patient states she feels well and is comfortable with going home with IV Yanick
adryl.



PHYSICAL EXAM ON DAY OF DISCHARGE:  VITAL SIGNS:  Blood pressure 94/65, pulse of 90, respiratory rate
 16, O2 sat 97% on room air, temperature afebrile.  GENERAL:  No acute distress, well-appearing.  SHAWANDA
GS:  Clear.  No wheezes, rales, or rhonchi.  No stridor.  No respiratory distress.  ABDOMEN:  Soft.  
EXTREMITIES:  No edema.



PENDING LABORATORY STUDIES THAT WILL NEED FOLLOWUP:  Plasma histamine, metanephrine and norepinephrin
es are pending.  Urine for metanephrines is also pending. Chromogranin A is pending.  Plasma histamin
e is pending.  Plasma total porphyrins were unremarkable.  Carrot allergen IgE antibody was unremarka
ble.  Tryptase level was within normal limits.  Urine porphyrins were normal.  Urine histamines are p
ending.  Urine metanephrine and normetanephrines are pending. p-ANCA and c-ANCA were negative.  Rheum
atoid factor was within the normal range of less than 8.6.



DISCHARGE MEDICATIONS:  Please refer to discharge medication reconciliation in [facility] for details.



DISCHARGE INSTRUCTIONS:  The patient will be discharged from the hospital, where she plans to follow 
up with Dr. José for further care and workup of her symptoms.





Job #:  [id number]

## 2017-01-31 NOTE — PDIAF
- Diagnosis


Diagnosis: mast cell activation syndrome


Code Status: Full Code





- Medication Management


Discharge Medications: 


 Medications to Continue on Transfer





Albuterol [Proventil Neb] 3 ml IH Q4HRS PRN 01/09/17 [Last Taken 01/14/17]


Cetirizine HCl [Zyrtec] 10 mg PO QID 01/09/17 [Last Taken 01/21/17 10:50]


Ranitidine HCl [Zantac] 150 mg PO BID 01/09/17 [Last Taken 01/21/17 10:50]


predniSONE 20 mg PO TID 01/17/17 [Last Taken 01/21/17 10:50]


EPINEPHRINE [EPIPEN] 0.3 mg IM PRN PRN 01/21/17 [Last Taken 01/21/17 13:15]


Ibuprofen [Motrin (*)] 200 mg PO Q2HRS PRN 01/21/17 [Last Taken Unknown]


Ibuprofen [Motrin (*)] 200 mg PO TID 01/21/17 [Last Taken 01/21/17 10:50]


diphenhydrAMINE [Benadryl Injection] 25 - 50 mg IM Q6 PRN 01/21/17 [Last Taken 

01/21/17]


diphenhydrAMINE [Benadryl Injection] 25 mg IVP Q4HRS PRN #0 inj 01/31/17 [Last 

Taken Unknown]


diphenhydrAMINE [Benadryl Injection] 300 mg IV CONT #0 inj 01/31/17 [Last Taken 

Unknown]








Discharge Medications: Refer to the Discharge Home Medication list for PRN 

reason.





- Orders


Services needed: Registered Nurse


Diet Recommendation: no restrictions on diet


Diet Texture: Regular Texture Diet





- Follow Up Care


Current Providers and Referrals: 


[place],. [Primary Care Provider] - As per Instructions

## 2017-02-22 NOTE — EDPHY
H & P


HPI/ROS: 





CHIEF COMPLAINT: Mast cell activation disorder, anaphylaxis. 





HISTORY OF PRESENT ILLNESS: This is a 36-year-old female with a mast cell 

activation syndrome who presents with a chief complaint of anaphylaxis. She was 

discharged from the hospital 3 weeks ago after a 9-day stay for an allergic 

reaction. She has been on a Benadryl drip (via a PICC line) and daily 

Prednisone since then. She has felt well but had persistent difficulty eating.  

This afternoon she began to feel "funny" and states she was going in and out of 

consciousness. She tried to eat but her throat began to swell and she developed 

symptoms similar to her previous reactions. She self-administered .3ml IM 

Epinephrine.  She now feels better after the epinephrine.  She is concerned 

that she is allergic to her menstrual cycle and thinks that she just started 

menstruating. She denies shortness of breath, oral swelling, fever, cough, or 

other complaints. This reaction is identical to her usual allergic reactions. 





REVIEW OF SYSTEMS:


A complete 10-point review of systems was performed and is negative except for 

those items mentioned in the HPI.





Past Medical/Surgical History: 





Mast Cell Activation Disorder, POTS. 


Social History: 





Nonsmoker. 


Physical Exam: 





General Appearance: Alert, smiling and pleasant


Eyes: Pupils equal and round, no conjunctival pallor or injection


ENT, Mouth: Mucous membranes moist, no oral swelling


Neck: Normal inspection, no stridor


Respiratory: Lungs are clear to auscultation, no wheezing


Cardiovascular: Regular rate and rhythm 


Gastrointestinal:  Abdomen is soft and non-tender 


Neurological:  A&O, nonfocal exam


Skin:  Warm and dry, no rash


Extremities:  Nontender, no pedal edema


Psychiatric: Mood and affect normal








Constitutional: 


 Initial Vital Signs











Temperature (C)  36.7 C   02/22/17 17:59


 


Heart Rate  123 H  02/22/17 17:59


 


Respiratory Rate  23 H  02/22/17 17:59


 


Blood Pressure  125/89 H  02/22/17 17:59


 


O2 Sat (%)  100   02/22/17 17:59








 











O2 Delivery Mode               Room Air














Allergies/Adverse Reactions: 


 





metronidazole [From Flagyl] Allergy (Severe, Verified 01/25/17 16:03)


 Other-Enter Comments


acetaminophen [From Vicodin] Allergy (Verified 01/21/17 14:04)


 


aspirin Allergy (Verified 01/21/17 14:04)


 


hydrocodone bitartrate [From Vicodin] Allergy (Verified 01/21/17 14:04)


 


morphine Allergy (Verified 01/21/17 14:04)


 








Home Medications: 














 Medication  Instructions  Recorded


 


Albuterol [Proventil Neb] 3 ml IH Q4HRS PRN 01/09/17


 


Cetirizine HCl [Zyrtec] 10 mg PO QID 01/09/17


 


Ranitidine HCl [Zantac] 150 mg PO BID 01/09/17


 


predniSONE 20 mg PO TID 01/17/17


 


EPINEPHRINE [EPIPEN] 0.3 mg IM PRN PRN 01/21/17


 


Ibuprofen [Motrin (*)] 200 mg PO Q2HRS PRN 01/21/17


 


Ibuprofen [Motrin (*)] 200 mg PO TID 01/21/17


 


diphenhydrAMINE [Benadryl 25 - 50 mg IM Q6 PRN 01/21/17





Injection]  


 


diphenhydrAMINE [Benadryl 25 mg IVP Q4HRS PRN #0 inj 01/31/17





Injection]  


 


diphenhydrAMINE [Benadryl 300 mg IV CONT #0 inj 01/31/17





Injection]  














Medical Decision Making





- Diagnostics


EKG Interpretation: 





EKG interpreted by me reveals sinus tachycardia, normal axis, normal intervals, 

ST and T segments normal. Left anterior fascicular block, CHARLI, nonspecific 

repolarization abnormality. Interpretation: abnormal EKG





Imaging: 





Chest x-ray reviewed by Dr. Irwin, radiology, reveals no evidence of acute 

cardiopulmonary abnormality.





ED Course/Re-evaluation: 





Patient presents with recurrent allergic recent symptoms, without objective 

evidence of allergic reaction.  She took epinephrine just prior to arrival and 

is tachycardiac, but otherwise her exam is normal.





1849: Consulted with Dr. José. She recommends blood work and a chest x-ray 

but is comfortable having the patient discharged home if she is doing  better. 

I discussed this with the patient at this time. She is comfortable with the 

plan. An IV was established and labs ordered. Chest x-ray obtained. Prednisone 

given.





2040: Reassessed patient. She is feeling much better and would like to go home. 

I gave her return precautions prior to discharge. She understands to follow up 

with Dr. José. She is comfortable with the plan. 


Differential Diagnosis: 





Differential diagnosis includes though it is not limited to laryngeal edema, 

bronchospasm, hypotension, angioedema.








- Data Points


Laboratory Results: 


 Laboratory Results





 02/22/17 19:12 





 02/22/17 19:12 








Medications Given: 


 








Discontinued Medications





Sodium Chloride (Ns)  1,000 mls @ 0 mls/hr IV ONCE ONE


   PRN Reason: Wide Open


   Stop: 02/22/17 18:20


   Last Admin: 02/22/17 18:31 Dose:  1,000 mls


Ibuprofen (Motrin)  600 mg PO EDNOW ONE


   Stop: 02/22/17 19:53


   Last Admin: 02/22/17 20:06 Dose:  600 mg


Ondansetron HCl (Zofran)  4 mg IVP EDNOW ONE


   Stop: 02/22/17 18:32


   Last Admin: 02/22/17 18:31 Dose:  4 mg


Prednisone (Prednisone)  20 mg PO EDNOW ONE


   Stop: 02/22/17 19:53


   Last Admin: 02/22/17 20:06 Dose:  20 mg








Departure





- Departure


Disposition: Home, Routine, Self-Care


Clinical Impression: 


Anaphylaxis


Qualifiers:


 Encounter type: initial encounter Qualified Code(s): T78.2XXA - Anaphylactic 

shock, unspecified, initial encounter





Condition: Good


Instructions:  Anaphylaxis (ED)


Additional Instructions: 


Follow up with Dr. José this week. 





Return to the emergency department if you experience any serious worsening of 

condition. 


Referrals: 


Dinorah José MD [Medical Doctor] - As per Instructions


Report Scribed for: Kellee Julien


Report Scribed by: Royal Oconnor


Date of Report: 02/22/17


Time of Report: 17:47


Physician Review and Approval Statement: 





02/22/17 17:47


Portions of this note were transcribed by a medical scribe.  I personally 

performed a history, physical exam, medical decision making, and confirmed 

accuracy of information the transcribed note.

## 2017-02-22 NOTE — CPEKG
Heart Rate: 118

RR Interval: 508

P-R Interval: 160

QRSD Interval: 90

QT Interval: 269

QTC Interval: 377

P Axis: 76

QRS Axis: -67

T Wave Axis: 262

EKG Severity - ABNORMAL ECG -

EKG Impression: SINUS TACHYCARDIA

EKG Impression: CHARLI, CONSIDER BIATRIAL ABNORMALITIES

EKG Impression: LEFT ANTERIOR FASCICULAR BLOCK

EKG Impression: NONSPECIFIC REPOL ABNORMALITY, DIFFUSE LEADS

Electronically Signed By: Kellee Julien 22-Feb-2017 21:20:03

## 2017-03-06 NOTE — GHP
DATE OF ADMISSION:  03/06/2017



CHIEF COMPLAINT:  Low potassium.



HISTORY OF PRESENT ILLNESS:  This is a 36-year-old female, who is cared for at 
[facility] from January 21st through January 31st of this year 
for treatment of what was thought to be exacerbation of mast cell activation 
syndrome. 



The patient was discharged home on a Benadryl drip, per the guidance of her 
allergist, Dr. Dinorah José.  Since her discharge, the patient tells me that 
she has decreased her Benadryl infusion rate to 14 mg/hour.  She has had less 
"anaphylactic" reaction since being on the IV Benadryl infusion; however, she 
has been having problems eating. 



Since her discharge, the patient states that she has been eating and drinking 
very little.  She feels like she is reacting to many different foods with 
flushing, nausea, and cramping.  She is allergic to dairy, as well as soy.  The 
past 3 days, she has been able to consume some flaxseed pancakes, and was able 
to reintroduce eggs 3 days ago.  She has not been drinking much water.  She is 
intolerant of electrolyte drinks.  She has not tried any dietary supplements. 



The patient had blood work done as an outpatient by her Home Health nurse where 
she was found to have a potassium of 2.8, at which time I was asked to admit 
back into the hospital for further care.



PAST MEDICAL HISTORY:  

1.  POTS.

2.  Suspected mast cell activation syndrome, on outpatient continuous IV 
Benadryl infusion.



HOME MEDICATIONS:  Were reviewed.  Refer to [facility] for details.



ALLERGIES:  Refer to list in [facility].



SOCIAL HISTORY:  She denies any alcohol, tobacco, or illicit drug use.



FAMILY HISTORY:  Reviewed and noncontributory.



REVIEW OF SYSTEMS:  Comprehensive 10-point review of systems was done, and is 
negative except for as mentioned in HPI.



PHYSICAL EXAM:  VITAL SIGNS:  Blood pressure 116/87, pulse of 114, respiratory 
rate 14, O2 saturation 94% on room air.  Temperature afebrile.  GENERAL:  No 
acute distress.  Thin, slightly cachectic, thinning hair.  HEAD:  Normocephalic 
atraumatic.  EYES:  PERRLA.  MOUTH:  Dry oral mucosa.  NECK:  Supple.  No 
lymphadenopathy.  CARDIOVASCULAR:  S1, S2, tachycardic.  No JVD.  No lower 
extremity edema.  PULMONARY:  Lungs are clear.  No wheezes, rales, or rhonchi.  
ABDOMEN:  Soft, nontender, nondistended.  No guarding or rebound tenderness.  
Normoactive bowel sounds.  EXTREMITIES:  No clubbing or cyanosis.  NEUROLOGIC:  
Cranial nerves 2-12 are grossly intact.  No focal motor or sensory deficits.  
SKIN:  Clear.  No rashes.



DIAGNOSTICS:  WBC 7.08, hemoglobin 12.5, hematocrit 34.5, platelets 160.  
Sodium 140, potassium 3.1, chloride 111, CO2 20, BUN 17, creatinine 0.5, 
glucose 98.  LFTs unremarkable.  Lipase slightly elevated at 502.



ASSESSMENT:  This is a 36-year-old female with suspected mast cell activation 
syndrome, on outpatient intravenous, continuous intravenous Benadryl, 
presenting with:

1.  Hypokalemia, in the setting of protein-calorie malnutrition from not eating 
due to multiple reported reactions to foods.

2.  History of elevated chromogranin A of unclear significance.

3.  Mildly elevated lipase.

4.  Adult failure to thrive.



PLAN:  

1.  The patient will be admitted to the hospital where her potassium will be 
replenished.

2.  I discussed the case with her primary care provider and allergist, Dr. Dinorah José, who is requesting a GI consultation to further evaluate her 
inability to eat, in the setting of elevated chromogranin A.  We will defer 
further testing such as a CT of the chest, abdomen and pelvis, to the  GI 
consultant.

3.  We will continue her IV Benadryl, and monitor for signs and symptoms of 
anaphylaxis.





Job #:  [id number]

MTDD

## 2017-03-06 NOTE — EDPHY
H & P


Time Seen by Provider: 17 15:27





- Medical/Surgical History


Hx Asthma: No


Hx Chronic Respiratory Disease: No


Hx Diabetes: No


Hx Cardiac Disease: No


Hx Renal Disease: No


Hx Cirrhosis: No


Hx Alcoholism: No


Hx HIV/AIDS: No


Hx Splenectomy or Spleen Trauma: No


Other PMH: postural tachycardia syndrome, mast cell dysfunction, postural 

hypotension,





- Social History


Smoking Status: Never smoked


Constitutional: 


 Initial Vital Signs











Temperature (C)  36.7 C   17 15:36


 


Heart Rate  114 H  17 15:36


 


Respiratory Rate  14   17 15:36


 


Blood Pressure  116/87 H  17 15:36


 


O2 Sat (%)  94   17 15:36








 











O2 Delivery Mode               Room Air














Allergies/Adverse Reactions: 


 





metronidazole [From Flagyl] Allergy (Severe, Verified 17 16:03)


 Other-Enter Comments


acetaminophen [From Vicodin] Allergy (Verified 17 14:04)


 


aspirin Allergy (Verified 17 14:04)


 


hydrocodone bitartrate [From Vicodin] Allergy (Verified 17 14:04)


 


morphine Allergy (Verified 17 14:04)


 








Home Medications: 














 Medication  Instructions  Recorded


 


Albuterol [Proventil Neb] 3 ml IH Q4HRS PRN 17


 


Cetirizine HCl [Zyrtec] 10 mg PO QID 17


 


Ranitidine HCl [Zantac] 150 mg PO BID 17


 


predniSONE 20 mg PO TID 17


 


EPINEPHRINE [EPIPEN] 0.3 mg IM PRN PRN 17


 


Ibuprofen [Motrin (*)] 200 mg PO Q2HRS PRN 17


 


Ibuprofen [Motrin (*)] 200 mg PO TID 17


 


diphenhydrAMINE [Benadryl 25 - 50 mg IM Q6 PRN 17





Injection]  


 


diphenhydrAMINE [Benadryl 25 mg IVP Q4HRS PRN #0 inj 17





Injection]  


 


diphenhydrAMINE [Benadryl 300 mg IV CONT #0 inj 17





Injection]  














Medical Decision Making


ED Course/Re-evaluation: 





CHIEF COMPLAINT:  Low potassium. 





HISTORY OF PRESENT ILLNESS: This is a 36-year-old female with a history of mast 

cell activation disorder who presents on advice of her allergist Dr. José 

for low potassium levels. She has had a lot of recent difficulty eating and 

drinking enough because she has anaphylactic reactions to so many foods. Her 

potassium was measured at 2.8 this morning during a routine blood draw. She was 

recently been placed on chronic IV fluids by Dr. José and has been 

attempting to eat using a lidocaine/Benadryl formula she has to swish before 

eating. She is also on a chronic Benadryl drip and receives 14mg/hour. She 

denies vomiting, diarrhea, fever. 





REVIEW OF SYSTEMS:  





A 10 point review of systems was performed and is negative with the exception 

of the elements mentioned in the history of present illness.





PHYSICAL EXAM:  





HR, BP, O2 Sat, RR.  Temp noted


General Appearance:  Alert, cachectic, thin, uncomfortable-appearing. 


Head:  Atraumatic without scalp tenderness or obvious injury


Eyes:  Pupils equal, round, reactive to light and accommodation, EOMI, no trauma

, no injection.


Ears:  Clear bilaterally, no perforation, normal landmarks


Nose:  Atraumatic, no rhinorrhea, clear.


Throat:  There is no erythema or exudates, no lesions, normal tonsils, mucus 

membranes moist.


Neck:  Supple, 2+ carotid upstroke, nontender, no lymphadenopathy.


Respiratory:  No retractions, no distress, no wheezes, and no accessory muscle 

use.  Lungs are clear to auscultation bilaterally.


Cardiovascular:  Regular rate and rhythm, no murmurs, rubs, or gallops. 

Bilateral carotid, radial, dorsalis pedis, and posterior tibial pulses intact. 

Good capillary refill all extremities.


Gastrointestinal:  Abdomen is soft, nontender, non-distended, no masses, no 

rebound, no guarding, no peritoneal signs.


Musculoskeletal:  Normal active ROM of all extremities, atraumatic.


Neurological:  Alert, appropriate, and interactive.  The patient has normal 

DTRs and non-focal cranial nerves, motor, sensory, and cerebellar exam.


Skin:  No rashes, good turgor, no nodules on palpation.





Past medical history: Mast cell activation disorder, POTS. 


Past surgical history: Denies.  


Family history: Non-contributory. 


Social history: Nonsmoker. 





DIAGNOSTICS/PROCEDURES/CRITICAL CARE TIME:  








DIFFERENTIAL DIAGNOSIS:   


The differential diagnosis for the patient includes, but is not limited to: 

hypokalemia, hyperkalemia, hypernatremia, hyponatremia, hypermagnesemia , 

hypomagnesemia. 





MEDICAL DECISION MAKIN-year-old female with mast cell activation disease presents for low potassium 

levels that were measured at her routine blood draw this morning at 10am. Her 

potassium was low at 2.8 and her magnesium was mildly elevated. She reports 

losing a large amount of weight recently and is unable to eat as she reacts to 

so many foods. She is on a chronic Benadryl drip at 14mg/hour. An IV will be 

established and labs ordered, especially electrolytes. 20meq potassium will be 

administered PO and IV. I will consult with Dr. José and determine her 

preferred course of action. 





1545: Consulted with Dr. José, allergist. She recommends admission for 

further workup as the patient is getting worse. She is concerned the patient 

has a possible eating disorder and her condition is not simply due to mast 

cell. 





1554: Consulted with Dr. Kelley, hospitalist. He accepts admission. 





1609: Reassessed patient. Discussed plan for discharge. I answered her 

questions. She is comfortable with the plan. 





- Data Points


Laboratory Results: 


 Laboratory Results





 17 15:50 





 











  17





  15:50 15:50


 


WBC    7.08 10^3/uL 10^3/uL





    (3.80-9.50) 


 


RBC    4.17 10^6/uL L 10^6/uL





    (4.18-5.33) 


 


Hgb    12.5 g/dL L g/dL





    (12.6-16.3) 


 


Hct    34.5 % L %





    (38.0-47.0) 


 


MCV    82.7 fL fL





    (81.5-99.8) 


 


MCH    30.0 pg pg





    (27.9-34.1) 


 


MCHC    36.2 g/dL g/dL





    (32.4-36.7) 


 


RDW    16.8 % H %





    (11.5-15.2) 


 


Plt Count    160 10^3/uL 10^3/uL





    (150-400) 


 


MPV    9.0 fL fL





    (8.7-11.7) 


 


Neut % (Auto)    83.4 % H %





    (39.3-74.2) 


 


Lymph % (Auto)    10.5 % L %





    (15.0-45.0) 


 


Mono % (Auto)    4.0 % L %





    (4.5-13.0) 


 


Eos % (Auto)    0.0 % L %





    (0.6-7.6) 


 


Baso % (Auto)    0.1 % L %





    (0.3-1.7) 


 


Nucleat RBC Rel Count    0.0 % %





    (0.0-0.2) 


 


Absolute Neuts (auto)    5.91 10^3/uL 10^3/uL





    (1.70-6.50) 


 


Absolute Lymphs (auto)    0.74 10^3/uL L 10^3/uL





    (1.00-3.00) 


 


Absolute Monos (auto)    0.28 10^3/uL L 10^3/uL





    (0.30-0.80) 


 


Absolute Eos (auto)    0.00 10^3/uL L 10^3/uL





    (0.03-0.40) 


 


Absolute Basos (auto)    0.01 10^3/uL L 10^3/uL





    (0.02-0.10) 


 


Absolute Nucleated RBC    0.00 10^3/uL 10^3/uL





    (0-0.01) 


 


Immature Gran %    2.0 % H %





    (0.0-1.1) 


 


Immature Gran #    0.14 10^3/uL H 10^3/uL





    (0.00-0.10) 


 


Sodium  Pending   





   


 


Potassium  Pending   





   


 


Chloride  Pending   





   


 


Carbon Dioxide  Pending   





   


 


Anion Gap  Pending   





   


 


BUN  Pending   





   


 


Creatinine  Pending   





   


 


Estimated GFR  Pending   





   


 


Glucose  Pending   





   


 


Calcium  Pending   





   


 


Total Bilirubin  Pending   





   


 


Conjugated Bilirubin  Pending   





   


 


Unconjugated Bilirubin  Pending   





   


 


AST  Pending   





   


 


ALT  Pending   





   


 


Alkaline Phosphatase  Pending   





   


 


Total Protein  Pending   





   


 


Albumin  Pending   





   


 


Lipase  Pending   





   











Medications Given: 


 








Discontinued Medications





Potassium Chloride (Potassium Chloride Oral Liquid)  20 meq PO EDNOW ONE


   Stop: 17 15:42


   Last Admin: 17 16:05 Dose:  20 meq








Departure





- Departure


Disposition: [facility]


Clinical Impression: 


 Hypokalemia, Anorexia, Cachexia, Electrolyte abnormality





Condition: Fair


Referrals: 


Jason Bianchi MD [Primary Care Provider] - As per Instructions


Report Scribed for: Sam Tucker


Report Scribed by: Royal Oconnor


Date of Report: 17


Time of Report: 15:46

## 2017-03-07 NOTE — SOAPPROG
SOAP Progress Note


Assessment/Plan: 


Assessment:EGD requested to evaluate severe postprandial abdominal pain, 

possibly due to mastocytosis. Exam reveals severe gastroduodenitis. CLOtest and 

histology taken, with request for  staining of all specimens in addition 

to standard H+E.  Pt already on Protonix.


























Plan:Await bx reports.





03/07/17 17:07





Objective: 





 Vital Signs











Temp Pulse Resp BP Pulse Ox


 


 36.7 C   109 H  18   100/64   98 


 


 03/07/17 15:29  03/07/17 15:29  03/07/17 15:29  03/07/17 15:29  03/07/17 15:29








 Laboratory Results





 03/07/17 06:00 





 











 03/06/17 03/07/17 03/08/17





 05:59 05:59 05:59


 


Intake Total  2834 


 


Output Total  800 300


 


Balance  2034 -300














ICD10 Worksheet


Patient Problems: 


 Problems











Problem Status Onset


 


Anorexia Acute  


 


Cachexia Acute  


 


Electrolyte abnormality Acute  


 


Hypokalemia Acute  


 


Allergic reaction Acute  


 


Anaphylaxis Acute  


 


Mast cell disease Acute

## 2017-03-07 NOTE — GPN
PROCEDURE:  Gastroscopy with biopsies.



INDICATIONS:  The patient is a 36-year-old female with a working diagnosis of mastocytosis, who has 
been having progressive issues with abdominal pain almost immediately postprandially.  Ultrasound ha
s been negative.  No other imaging could be performed due to concern about reaction to contrast dye.
 Patient has lost significant weight in the past half year.  Gastroscopy is being requested to evalu
ate.



DESCRIPTION OF PROCEDURE:  After proper consent was obtained, patient was left lateral decubitus pos
ition, underwent propofol sedation for ASA class is 3. 



Videogastroscope was introduced through the mouth, down the esophagus, into the stomach, past the py
lorus, into the duodenum.  Findings were as follows:

1.  Esophagus is grossly normal.

2.  Stomach has a significant gastritis pattern throughout, more pronounced in the fundus. There are
 some small spontaneous bleeding sites noted.  There is also a small amount of retained stomach cont
ents.  Biopsies were taken for histology of the antrum and fundus, and also a CLOtest was taken.

3.  The duodenum was entered and appears grossly inflamed.  This was also biopsied. 



At this point, the instrument was removed.  Patient tolerated the procedure well and was returned to
 the recovery room in stable condition.



RECOMMENDATIONS:  

1.  Patient to continue on pantoprazole.

2.  At Dr. José's request, a  stain will be performed on the histology to look for mast cell
 activity.





Job #:  [id number]

## 2017-03-07 NOTE — HOSPPROG
Hospitalist Progress Note


Assessment/Plan: 





#Abdominal cramping: improved with hydration. 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


-Chromagranin A elevated 233. Lipase mildly elevated. DDx includes 

neuroendocrine tumor.  Discussed case with Dr. Aiken and underwent EGD that 

showed gastroduodenitis. 





#Gastroduodenitis: change PPI to BID





#MCAS: followed by Dr. José. Cont pred and Benadryl gtt, prednisone





#Severe protein caloric malnutrition: BMI <15%, loss >7.5% body weight in 3 

months. Dietician consulted





#Weakness: improved with IVFs, K. PT/OT





#Hypokalemia: due to poor PO intake. Cont serial labs





#Diet: regular





#DVT ppx: SCDs





#Disp: warrants inpt admission with electrolyte abnormalities, cannot ambulate 

thus risk for falls and subsequent harm


Subjective: mild abd cramping with breakfast


Objective: 


 Vital Signs











Temp Pulse Resp BP Pulse Ox


 


 36.6 C   86   16   94/71 L  96 


 


 03/07/17 07:12  03/07/17 07:12  03/07/17 07:12  03/07/17 07:12  03/07/17 07:12








 Laboratory Results





 03/07/17 06:00 





 











 03/06/17 03/07/17 03/08/17





 05:59 05:59 05:59


 


Intake Total  2834 


 


Output Total  800 300


 


Balance  2034 -300














- Physical Exam


Constitutional: cachectic


Eyes: PERRL


Ears, Nose, Mouth, Throat: moist mucous membranes


Cardiovascular: regular rate and rhythym, no murmur, rub, or gallop


Respiratory: no respiratory distress, no rales or rhonchi


Gastrointestinal: normoactive bowel sounds


Genitourinary: no bladder fullness


Skin: warm


Musculoskeletal: generalized weakness, other (significant muscle wasting LEs)


Neurologic: AAOx3


Psychiatric: interacting appropriately





ICD10 Worksheet


Patient Problems: 


 Problems











Problem Status Onset


 


Anorexia Acute  


 


Cachexia Acute  


 


Electrolyte abnormality Acute  


 


Hypokalemia Acute  


 


Allergic reaction Acute  


 


Anaphylaxis Acute  


 


Mast cell disease Acute

## 2017-03-08 NOTE — SOAPPROG
SOAP Progress Note


Assessment/Plan: 


Assessment:





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


    - currently on benadryl 14mg/hr infusion


    - ibuprofen 600mg tid


    - ranitidine 150mg tid


    - prednisone 20mg bid at 7am and 18 and 30mg at 23:00


    


2) Abdominal pain- severe duodenitis identified on EGD yesterday


    -NOE test pending


    - staining pending





3) Hypokalemia


    - improving with replacement 





4) Malnutrition


    - appreciate nutrition consultation


    - recent significant lack of po intake likely multifactorial with MCAS/Food 

allergies/Sensitivities combined with duodenitis





4) History of hyperadrenergic POTS's dx at [facility]


    





Plan:





Would try discontinuing scheduled ibuprofen and changing it to prn since it has 

been most helpful in relationship in preventing perimenstrual related 

anaphylaxis


Change prednisone to twice daily with 20mg at 7am and 30mg at 1800 and DC the 

2300 dose


Continue with the benadryl infusion, ranitidine, zyrtec


She will continue to work on advancing her oral diet cautiously

















03/08/17 15:55





03/08/17 16:01





03/08/17 16:53





Subjective: 


Bindu reports feeling better since receiving potassium.  Muscle strength has 

improved.   Mast cell symptoms- itching, flushing, angioedema, internal 

stinging sensation controlled, still has some flushing and mild flu like 

symptoms in the early evening but they are manageable.  Still present for 

example after eating peas and chicken last night had allergic type symptoms 

different from the cramping abdominal pain which is newer in the past few weeks 

and suspected to be related to her duodenitis.   She is anxious to expand diet. 

Discussed ibuprofen and prednisone dosing.  She is comfortable discontinuing 

the ibuprofen as it has been most helpful in preventing perimenstrual related 

anaphylactic symptoms.  Also the early morning awakenings with her allergic 

reactions have subsided so discussed stopping the 11pm dose of prednisone. 


Objective: 


GEN:  A&O x3, thin


 Resp: normal effort


 Ext: no CCE





 Vital Signs











Temp Pulse Resp BP Pulse Ox


 


 36.6 C   109 H  20   113/74   99 


 


 03/08/17 15:27  03/08/17 15:27  03/08/17 15:27  03/08/17 15:27  03/08/17 15:27








 Laboratory Results





 03/08/17 06:45 





 











 03/07/17 03/08/17 03/09/17





 05:59 05:59 05:59


 


Intake Total 3754 3618 


 


Output Total 800 600 300


 


Balance 2034 3018 -300














- Time Spent With Patient


Time Spent With Patient: 





40 minutes





ICD10 Worksheet


Patient Problems: 


 Problems











Problem Status Onset


 


Anorexia Acute  


 


Cachexia Acute  


 


Electrolyte abnormality Acute  


 


Hypokalemia Acute  


 


Allergic reaction Acute  


 


Anaphylaxis Acute  


 


Mast cell disease Acute

## 2017-03-08 NOTE — HOSPPROG
Hospitalist Progress Note


Assessment/Plan: 





#Abdominal cramping: improved with hydration. 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


-Chromagranin A elevated 233. Lipase mildly elevated. DDx includes 

neuroendocrine tumor.  Discussed case with Dr. Aiken and underwent EGD that 

showed gastroduodenitis. Biopsies pending





#Gastroduodenitis: Protonix 40mg BID, Ranitidine





#Chronic Prednisone use: has been on high dose of prednisone for a long period 

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





#MCAS: followed by Dr. José. Cont pred and Benadryl gtt. 


-Zyrtec, Ranitidine, Ibuprofen (will continue for now despite significant 

gastroduodenitis, await reccs from Dr. José)


-Dr. José to see this evening





#Severe protein caloric malnutrition: BMI <15%, loss >7.5% body weight in 3 

months. Dietician consulted





#Adult FTT





#Weakness: improved with IVFs, K. PT/OT





#Hypokalemia: due to poor PO intake. She has low K stores. I will start daily 

oral replacement. Will also check Mg.





#Diet: regular. Stopping IVF today, good fluid intake





#DVT ppx: SCDs





#Disp: warrants inpt admission with electrolyte abnormalities, cannot ambulate 

thus risk for falls and subsequent harm








S: 


Still with abd discomfort


Did eat some oral 


Good fluid intake


She is ok with starting potassium supplementation





Objective: 


 Vital Signs











Temp Pulse Resp BP Pulse Ox


 


 36.9 C   108 H  20   100/68   99 


 


 03/08/17 11:36  03/08/17 11:36  03/08/17 11:36  03/08/17 11:36  03/08/17 11:36








 Laboratory Results





 03/08/17 06:45 





 











 03/07/17 03/08/17 03/09/17





 05:59 05:59 05:59


 


Intake Total 2834 3618 


 


Output Total 800 600 300


 


Balance 2034 3018 -300














- Physical Exam


Constitutional: no apparent distress, appears nourished, not in pain


Eyes: PERRL, anicteric sclera, EOMI


Ears, Nose, Mouth, Throat: moist mucous membranes, hearing normal, ears appear 

normal, no oral mucosal ulcers


Cardiovascular: regular rate and rhythym, no murmur, rub, or gallop


Respiratory: no respiratory distress, no rales or rhonchi, clear to auscultation


Gastrointestinal: normoactive bowel sounds, tenderness


Skin: warm, normal color


Neurologic: AAOx3, sensation intact bilaterally, weakness


Psychiatric: interacting appropriately, not anxious, not encephalopathic, 

thought process linear





ICD10 Worksheet


Patient Problems: 


 Problems











Problem Status Onset


 


Anorexia Acute  


 


Cachexia Acute  


 


Electrolyte abnormality Acute  


 


Hypokalemia Acute  


 


Allergic reaction Acute  


 


Anaphylaxis Acute  


 


Mast cell disease Acute

## 2017-03-08 NOTE — SOAPPROG
SOAP Progress Note


Assessment/Plan: 


Assessment:EGD requested to evaluate severe postprandial abdominal pain, 

possibly due to mastocytosis. Exam reveals severe gastroduodenitis. CLOtest and 

histology taken, with request for  staining of all specimens in addition 

to standard H+E.  Pt already on Protonix.


























Plan:Await bx reports.





03/07/17 17:07





03/08/17 15:29


Feels slightly better today. Pathology and CLOtest results all pending. H+E 

should be read tomorrow,  stains should be read the day after. IF CLOtest 

is positive, will need to see if she can tolerate Prevpac (contains amoxicillin 

and Biaxin but not Flagyl.)  I will be out the rest of the week, [facility] will be covering me for any questions.


03/08/17 15:33





03/08/17 15:34





Objective: 





 Vital Signs











Temp Pulse Resp BP Pulse Ox


 


 36.6 C   109 H  20   113/74   99 


 


 03/08/17 15:27  03/08/17 15:27  03/08/17 15:27  03/08/17 15:27  03/08/17 15:27








 Laboratory Results





 03/08/17 06:45 





 











 03/07/17 03/08/17 03/09/17





 05:59 05:59 05:59


 


Intake Total 1300 7282 


 


Output Total 800 600 300


 


Balance 2034 3018 -300














ICD10 Worksheet


Patient Problems: 


 Problems











Problem Status Onset


 


Allergic reaction Acute  


 


Mast cell disease Acute  


 


Anaphylaxis Acute  


 


Hypokalemia Acute  


 


Anorexia Acute  


 


Cachexia Acute  


 


Electrolyte abnormality Acute

## 2017-03-09 NOTE — PDCONSULT
Consultant Note: 





Allergy Consult Note





S: Doing ok today.  Has felt some sensations of "reactivity" after eating but 

is manageable.  Looking forward to coconut ice cream tonight.  Discussed vit d 

level.  Weak.  Abd pain with any food ingested.








O: 


Gen: Pale


Ext: no cce


Neuro: A&O x3








Imp


1) Duodenitis- bx results pending


2) Mast cell d/o- relatively stable


3) Hypokalemia


4) Malnutrition


5) Severe vit d deficiency








PLAN:


1) Continue Benadryl infusion at same dose as she is tolerating this well


2) Will eval how she does on lower dose of prednisone and would try to taper as 

tolerates


3) Vit D supplementation for undetectable vit d


4) Will fu on bx results


5) Working on advancing diet

## 2017-03-09 NOTE — HOSPPROG
Hospitalist Progress Note


Assessment/Plan: 





#Abdominal cramping: improved with hydration. 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


-Chromagranin A elevated 233. Lipase mildly elevated. DDx includes 

neuroendocrine tumor.  Discussed case with Dr. Aiken and underwent EGD that 

showed gastroduodenitis. Biopsies pending


-Cont with Benadryl drip





#Gastroduodenitis: Protonix 40mg BID, Ranitidine





#Chronic Prednisone use: has been on high dose of prednisone for a long period 

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


-Tapering Prednisone today. Pred 20 qam, 30 q 1800





#MCAS: followed by Dr. José. Cont pred and Benadryl gtt. 


-Zyrtec, Ranitidine, 


-Discontinue Ibuprofen. Can use PRN





#Severe protein caloric malnutrition: BMI <15%, loss >7.5% body weight in 3 

months. Dietician consulted





#Adult FTT





#Weakness: improved with IVFs, K. PT/OT





#Hypokalemia: 


-Resolving on daily oral replacement


-due to poor PO intake.





#Diet: regular.





#DVT ppx: SCDs





#Disp: warrants inpt admission with prednisone taper, nutrition optimization, 

cannot ambulate thus risk for falls and subsequent harm








S: 


PO intake is improving


Denies CP, SOB, N/V





Objective: 


 Vital Signs











Temp Pulse Resp BP Pulse Ox


 


 36.6 C   96   18   107/82 H  98 


 


 03/09/17 12:23  03/09/17 12:23  03/09/17 12:23  03/09/17 12:23  03/09/17 12:23








 Laboratory Results





 03/09/17 05:20 





 03/09/17 05:20 





 











 03/08/17 03/09/17 03/10/17





 05:59 05:59 05:59


 


Intake Total 3618 984 


 


Output Total 600 300 


 


Balance 3018 684 














- Physical Exam


Constitutional: no apparent distress, appears nourished, not in pain


Eyes: PERRL, anicteric sclera, EOMI


Ears, Nose, Mouth, Throat: moist mucous membranes, hearing normal, ears appear 

normal, no oral mucosal ulcers


Cardiovascular: regular rate and rhythym, no murmur, rub, or gallop


Respiratory: no respiratory distress, no rales or rhonchi, clear to auscultation


Gastrointestinal: normoactive bowel sounds, soft, non-tender abdomen, no 

palpable masses


Skin: warm, normal color, No mottled


Musculoskeletal: generalized weakness


Neurologic: AAOx3, sensation intact bilaterally


Psychiatric: interacting appropriately, not anxious, not encephalopathic, 

thought process linear





ICD10 Worksheet


Patient Problems: 


 Problems











Problem Status Onset


 


Anorexia Acute  


 


Cachexia Acute  


 


Electrolyte abnormality Acute  


 


Hypokalemia Acute  


 


Allergic reaction Acute  


 


Anaphylaxis Acute  


 


Mast cell disease Acute

## 2017-03-10 NOTE — HOSPPROG
Hospitalist Progress Note


Assessment/Plan: 





# severe gastoduodenitis/post-prandial abd pain - await bx from EGD 3/8


   - cont ppi


# weakness/deconditioning - slowly improving


# hypoK - resolved


# severe protein calorie malnutrition


# MCAS - Dr José consulting


   - cont benadryl gtt, BMX pre-prandial, zyrtec


   - tapering pred


##


chart reviewed


op note reviewed





Subjective: ambulated with a walker today


Objective: 


 Vital Signs











Temp Pulse Resp BP Pulse Ox


 


 36.7 C   97   19   102/82 H  97 


 


 03/10/17 12:10  03/10/17 12:10  03/10/17 12:10  03/10/17 12:10  03/10/17 12:10








 Laboratory Results





 03/09/17 05:20 





 03/10/17 05:24 





 











 03/09/17 03/10/17 03/11/17





 05:59 05:59 05:59


 


Intake Total 984 1150 


 


Output Total 300  


 


Balance 684 1150 














- Physical Exam


Constitutional: cachectic


Cardiovascular: regular rate and rhythym, no murmur, rub, or gallop


Respiratory: no respiratory distress, no rales or rhonchi, clear to auscultation


Gastrointestinal: normoactive bowel sounds, soft, non-tender abdomen, no 

palpable masses





ICD10 Worksheet


Patient Problems: 


 Problems











Problem Status Onset


 


Anorexia Acute  


 


Cachexia Acute  


 


Electrolyte abnormality Acute  


 


Hypokalemia Acute  


 


Allergic reaction Acute  


 


Anaphylaxis Acute  


 


Mast cell disease Acute

## 2017-03-12 NOTE — HOSPPROG
Hospitalist Progress Note


Assessment/Plan: 





# severe gastoduodenitis/post-prandial abd pain - bx with significant mast cells


   - cont ppi, BMX


# weakness/deconditioning - slowly improving; more aggressive activity today


   - still unsafe while ambulating


# tachycardia - better after IVF yesterday; will give another 1L NS today


# hypoK - resolved


# severe protein calorie malnutrition


# MCAS - path from bx consistent with MCAS


   - cont benadryl gtt, BMX pre-prandial, zyrtec


   - tapering pred


# low vit D - ergo given yesterday





##


mod risk





Subjective: Feels less dry after receiving IV fluids;  tolerated breakfast 

relatively well;  ambulated to door yesterday


Objective: 


 Vital Signs











Temp Pulse Resp BP Pulse Ox


 


 36.6 C   81   18   111/79   98 


 


 03/12/17 08:00  03/12/17 08:00  03/12/17 08:00  03/12/17 08:00  03/12/17 08:00








 Laboratory Results





 03/09/17 05:20 





 03/12/17 04:30 





 











 03/11/17 03/12/17 03/13/17





 04:59 05:59 05:59


 


Intake Total   


 


Balance   








Vitals reviewed


Pleasant, no acute distress


Regular rate and rhythm, no murmurs rubs or gallops


No respiratory distress, lungs clear to auscultation bilaterally, no wheezes or 

rales


Abdomen soft nontender, nondistended, no hepatosplenomegaly





ICD10 Worksheet


Patient Problems: 


 Problems











Problem Status Onset


 


Allergic reaction Acute  


 


Mast cell disease Acute  


 


Anaphylaxis Acute  


 


Hypokalemia Acute  


 


Anorexia Acute  


 


Cachexia Acute  


 


Electrolyte abnormality Acute

## 2017-03-13 NOTE — PDCONSULT
Consultant Note: 





Allergy Consult Note





S: Currently having blurred vision, increase in abdominal pain.  Both have been 

worse after eating. Had signficant reaction to fragrance last night.  Stonger, 

has been eating more with less abd pain attributed to duodenitis.  Benadryl 

drip interrupted on Friday x 2.





O:


Gen-  A&0 x 3


Resp- normal effort


Ext- pale





Impression:


MCAS  with increased number of mast cells in GI bx





Recommendations:


- Continue benadryl infusion at present rate


- Boluses of 12.5mg added prn


- Try adding oral cromolyn prior to meals which would help stabalize the 

intestinal mast cells, if she does not tolerate this we could  try oral 

ketotifen which she has from a compounding pharmacy at home


- Bone marrow biopsy would be helpful to evaluate for systemic mastocytosis

## 2017-03-14 NOTE — HOSPPROG
Hospitalist Progress Note


Assessment/Plan: 


35 yo male, new to me today, treated for mast cell activation syndrome and 

improving.  discussed with allergist following patient.  Patient and allergist 

want bone marrow biopsy to guide therapy as she is improving.  








-  severe gastoduodenitis/post-prandial abd pain - bx with significant mast 

cells


   - cont ppi, BMX


-  weakness/deconditioning - slowly improving; more aggressive activity today


   - still unsafe while ambulating


-  tachycardia - persist yet today, no symptoms.  will follow


-  hypoK - resolved


-  severe protein calorie malnutrition.  Possible improvement in intake today.  

Patient says she is improving.  Less abdominal pain


-  MCAS - path from bx consistent with MCAS


   - cont benadryl gtt, BMX pre-prandial, zyrtec


   - tapering pred


-  low vit D - ergo started








Plan:


-per above.  Will decrease prednisone and obtain BMB


-discussed with Dinorah José allergist for 25 minutes


Subjective: feeling improved.  appetite better, less abdominal pain


Objective: 


 Vital Signs











Temp Pulse Resp BP Pulse Ox


 


 36.7 C   105 H  16   110/73   98 


 


 03/14/17 12:00  03/14/17 12:00  03/14/17 12:00  03/14/17 12:00  03/14/17 12:00








 Laboratory Results





 03/09/17 05:20 





 03/14/17 04:25 





 











 03/13/17 03/14/17 03/15/17





 05:59 05:59 05:59


 


Intake Total 1997 1408 


 


Balance 1997 1408 














- Time Spent With Patient


Time Spent with Patient: greater than 35 minutes


Time Spent with Patient: Greater than 35 minutes spent on this patients care, 

greater than 50% of time spent counseling, educating, and coordinating care 

regarding the above mentioned plan.





- Pending Discharge


Pending Discharge Within 24 Hours: No


Pending Discharge Within 48 Hours: No





- Physical Exam


Constitutional: no apparent distress


Eyes: PERRL


Ears, Nose, Mouth, Throat: moist mucous membranes, hearing normal


Cardiovascular: regular rate and rhythym, tachycardia


Respiratory: no respiratory distress, no rales or rhonchi


Gastrointestinal: normoactive bowel sounds, soft, non-tender abdomen, no 

palpable masses


Skin: warm


Neurologic: AAOx3, CN II-XII Intact


Psychiatric: interacting appropriately





ICD10 Worksheet


Patient Problems: 


 Problems











Problem Status Onset


 


Anorexia Acute  


 


Cachexia Acute  


 


Electrolyte abnormality Acute  


 


Hypokalemia Acute  


 


Allergic reaction Acute  


 


Anaphylaxis Acute  


 


Mast cell disease Acute

## 2017-03-15 NOTE — HOSPPROG
Hospitalist Progress Note


Assessment/Plan: 


35 yo female,  treated for mast cell activation syndrome and improving.  

discussed with allergist following patient.  Patient and allergist want bone 

marrow biopsy to guide therapy as she is improving.  








-  severe gastoduodenitis/post-prandial abd pain - bx with significant mast 

cells.  Today the patient had a reaction to a brandon.  She had facial flushing 

some abdominal cramping and pain and cognitive confusion.  During my 

examination this had mostly past and she was feeling improved.  


   - cont ppi, BMX


-  weakness/deconditioning - slowly improving; more aggressive activity today


   - still unsafe while ambulating.  Patient can barely transfer to go to the 

bathroom.  The musculoskeletal weakness is due to the mass cell activation 

syndrome.  She is slowly improving but she is certainly not not ready to go 

home at this time.


-  tachycardia - persist yet today, no symptoms.  will follow


-  hypoK - resolved


-  severe protein calorie malnutrition.  Possible improvement in intake today.  

Patient says she is improving.  Less abdominal pain.  She is eating better.


-  mast cell activation syndrome. - path from bx consistent with MCAS


   - cont benadryl gtt, BMX pre-prandial, zyrtec


   - tapering pred


-  low vit D - ergo started


-DVT prophylaxis:  This is with ambulation.  The patient is out of bed numerous 

times a day but cannot walk very much.  Though she is at some risk of DVT I 

believe the risk of an additional medication in her MCA S warrants using early 

ambulation rather than Lovenox.  I will apply SCDs while she is in bed.








Plan:


-per above.  


-discussed with Dinorah José, allergist for 25 minutes


-I discussed the bone marrow biopsy with Oncology and with Dr. Dinorah José.  

Oncology would like to do as an outpatient.  Dr. José indicates that it will 

not change her therapy but she has been having problems getting her to 

outpatient therapy due to her weakness.  Thus if it is possible to do it as an 

inpatient would be helpful in her therapy.


-disposition:  Patient will need probably 2-3 more days of therapy and 

aggressive PT to gain enough strength to go home.


Subjective: Reports she had a reaction to a brandon with flushing confusion and 

cramping abdominal pain.  There was no shortness of breath or tightness in her 

throat she has had no nausea or vomiting.  She reports she is eating better.


Objective: 


 Vital Signs











Temp Pulse Resp BP Pulse Ox


 


 36.6 C   113 H  18   120/66   98 


 


 03/15/17 11:55  03/15/17 11:55  03/15/17 11:55  03/15/17 11:55  03/15/17 11:55








 Laboratory Results





 03/09/17 05:20 





 03/15/17 06:00 





 











 03/14/17 03/15/17 03/16/17





 05:59 05:59 05:59


 


Intake Total 1408 3127 531


 


Output Total  1000 250


 


Balance 1408 2127 281














- Time Spent With Patient


Time Spent with Patient: greater than 35 minutes


Time Spent with Patient: Greater than 35 minutes spent on this patients care, 

greater than 50% of time spent counseling, educating, and coordinating care 

regarding the above mentioned plan.





- Physical Exam


Constitutional: no apparent distress, chronically ill appearing


Eyes: PERRL


Ears, Nose, Mouth, Throat: moist mucous membranes, hearing normal


Cardiovascular: regular rate and rhythym, no murmur, rub, or gallop, tachycardia


Respiratory: no respiratory distress, no rales or rhonchi, clear to auscultation


Gastrointestinal: normoactive bowel sounds, soft, non-tender abdomen, no 

palpable masses


Skin: warm, other (No flushing.)


Musculoskeletal: generalized weakness


Neurologic: AAOx3, CN II-XII Intact


Psychiatric: interacting appropriately





ICD10 Worksheet


Patient Problems: 


 Problems











Problem Status Onset


 


Anorexia Acute  


 


Cachexia Acute  


 


Electrolyte abnormality Acute  


 


Hypokalemia Acute  


 


Allergic reaction Acute  


 


Anaphylaxis Acute  


 


Mast cell disease Acute

## 2017-03-16 NOTE — PDCONSULT
Consultant Note: 





S:  Had unusual day today.  Confused this am.  Felt reactive to ambulation. 

fell on scale today.  legs still weak.  advancing diet.  discussed dc.





O: AXO x 3


    Ext- tn, no cce








Impression:





Mast cell activation disorder


 - continue benadryl drip with prn boluses


 - continue ranitidine 150mg po tid


 -continue zyrtec 10mg po bid


 -discussed increasing her prednisone again due to weakness this am but would 

prefer to hold off on this unless more clear indication


 -will have her follow up with [facility] for outpatient bone marrow biospy








Duodenitis


 - improving 





Malnutition


 - working on advancing po diet


 





Weakness


 -significant in LE





Hypokalemia


 -has improved

## 2017-03-16 NOTE — HOSPPROG
Hospitalist Progress Note


Assessment/Plan: 


Assessment:  36-year-old female presents with acute abdominal pain in the 

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





Plan:





1. Abdominal pain.  Acute, suspected at least some component of it is related 

to gastroduodenitis in the setting of mast cell activation


-PPI has been initiated, will need to be continued and reassessed in the 

outpatient setting by Dr. Aiken


-continue to advance diet as tolerates





2. 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  Based on biopsy results of the 

stomach and duodenum, demonstrating 2 fold increase in mast cell presents which 

can be consistent with an abnormal mast cell activation process


-at the present time, we only have pathology evidence of this being localized 

to the GI system


-that being said, the patient has a constellation of other symptoms which her 

allergist believes may be attributable to systemic proliferation


-her allergist as requested a bone marrow biopsy for further evaluation, and 

after my discussion with Dr. Valdes, we both agree that this should be 

scheduled and performed on an outpatient basis, and can be scheduled through 

[facility] prior to the patient's discharge as there is 

currently no inpatient indication for this procedure


-the patient has been initiated on a continuous Benadryl infusion as an 

outpatient via PICC line, this is being continued


-she is currently receiving a prednisone prolonged taper under the direction of 

Dr. José


-the patient is very cautious about foods and environmental stimuli and she has 

advanced her diet very cautiously and slowly in the inpatient setting, finding 

foods that do not seem to upset her


-patient will follow up with Dr. José in the outpatient setting





3. Severe protein calorie malnutrition.  Evidenced by a low BMI of 16.1, 

limited oral intake, dietary consultation appreciated, patient advancing her 

diet as tolerated


-continue to encourage oral intake and increasing the volume and consistency 

with which she eats foods that her system appears to tolerate





4. Hypokalemia.  Secondary to poor oral intake, resolved





5. Vitamin-D deficiency.  Ergocalciferol started





6. Tachycardia.  Nonpathologic, most likely secondary to low BMI as well as 

underlying suspected mood disorder





7. Generalized weakness.  Most likely secondary to combination of poor oral 

intake, potentially mast cell immunologic phenomena, potential underlying under 

treated mood or anxiety disorder, and deconditioning in the setting of all of 

these


-encourage the patient to engage with physical and occupational therapy


-she will most likely require home PT moving forward


-I suspect that the patient has intermittent "cognitive confusion" is more of a 

psychosomatic symptom and does not have underlying organic basis


-I would highly recommend that the patient see a psychiatrist in the outpatient 

setting, to be arranged through her primary care provider or her primary 

allergist because I suspect at the very least the patient is having challenges 

with adjustment to medicalization and illness





Diet.  Regular, as tolerated





Prophylaxis.  Recommend pharmacologic prophylaxis, patient declining





Code.  Full





Disposition.  Anticipated discharge is 3/17/2017, pending ability to safely 

ambulate which is being aggressively assessed this afternoon.





Subjective: Patient reports that she is tolerating oral intake of selected foods

, it has only been able to transfer and ambulate to bedside commode with 

assistance, has not been ambulating outside of room


Objective: 


 Vital Signs











Temp Pulse Resp BP Pulse Ox


 


 36.8 C   124 H  20   119/73   96 


 


 03/16/17 12:15  03/16/17 12:30  03/16/17 12:15  03/16/17 12:15  03/16/17 12:30








 Laboratory Results





 03/09/17 05:20 





 03/15/17 06:00 





 











 03/15/17 03/16/17 03/17/17





 05:59 05:59 05:59


 


Intake Total 3127 5445.5 


 


Output Total 1000 250 


 


Balance 2127 5195.5 














- Time Spent With Patient


Time Spent with Patient: greater than 35 minutes


Time Spent with Patient: Greater than 35 minutes spent on this patients care, 

greater than 50% of time spent counseling, educating, and coordinating care 

regarding the above mentioned plan.





- Physical Exam


Constitutional: no apparent distress, not in pain, other, No uncomfortable (

Thin appearing)


Cardiovascular: tachycardia, No systolic murmur, No irregularly irregular, No 

edema


Respiratory: no respiratory distress, no rales or rhonchi, clear to auscultation


Gastrointestinal: normoactive bowel sounds, soft, non-tender abdomen, no 

palpable masses


Neurologic: AAOx3


Psychiatric: interacting appropriately, not anxious, not encephalopathic, 

thought process linear





ICD10 Worksheet


Patient Problems: 


 Problems











Problem Status Onset


 


Allergic reaction Acute  


 


Mast cell disease Acute  


 


Anaphylaxis Acute  


 


Hypokalemia Acute  


 


Anorexia Acute  


 


Cachexia Acute  


 


Electrolyte abnormality Acute

## 2017-03-17 NOTE — PDDCSUM
Discharge Summary


Discharge Summary: 


DISCHARGE SUMMARY





FOLLOW-UP ITEMS: 


1. Consider outpatient mental health/psychiatrist referral


2. Continuously reassess need for IV Benadryl infusion and PICC line removal





DATE OF ADMISSION:  3/6/2017


DATE OF DISCHARGE:  3/17/17





DISCHARGE DIAGNOSES:


1. Acute abdominal pain


2. Duodenitis


3. 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


4. Severe protein calorie malnutrition


5. Hypokalemia


6. Vitamin-D deficiency


7. Nonpathologic tachycardia


8. Chronic generalized weakness





CONSULTATIONS:


Immunology by Dr. Dinorah José, Gastroenterology by Dr. Angel Aiken





PROCEDURES / IMAGING:


Upper endoscopy by Dr. Angel Aiken





CHIEF COMPLAINT:


Acute abdominal pain and generalized weakness





SUBJECTIVE:


Patient reports that she is tolerating solids and liquids appropriately, she 

continues to experience generalized weakness, she continues to experience 

intermittent dizziness at rest and with positional changes





PHYSICAL EXAM ON DISCHARGE:


Systolic blood pressure is 110, heart rate ranges between 50 and 110, alert 

awake oriented x3 no apparent distress, thin appearing, reports that her mood 

is good, no pain





LABS ON DISCHARGE:


Potassium is 3.7, creatinine 0.4, hemoglobin 10.6, H pylori negative, lipase 502

, vitamin-D level undetectable





HOSPITAL COURSE BY PROBLEM:


1. Acute abdominal pain. Suspect that at least some of this is related to 

gastric duodenitis in the setting of possible mast cell activation, her 

abdominal discomfort has significantly improved after initiating PPI and 

providing her with reassurance that nothing else insidious is going on.  She 

did have an elevated lipase level but her clinical presentation course was not 

consistent with acute pancreatitis and so this is a spuriously elevated lab 

result and not clinically relevant.


2. Duodenitis.  Most likely cause of patient's abdominal pain is inflammation 

in the stomach and proximal small bowel, as visualized on upper endoscopy by 

Dr. Angel Aiken.  H pylori was negative and she was initiated on a PPI as 

well as her scheduled 3 times daily ranitidine.  I would recommend that she 

remain on PPI at least once daily for the next month and she is experiencing 

any recurrent or worsening of symptoms, she should increase it to twice daily 

for a total of 4 weeks and then reduced to once daily thereafter.  Would also 

recommend that she follow up with Dr. Aiken after this hospitalization for 

reassessment of symptoms from a GI standpoint.


3. 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  This is based on biopsy results of 

the stomach and duodenum demonstrating a twofold increase in her mast cell 

count which can be consistent with an abnormal mass cell activation process.  I 

reviewed her extensive records in our system consisting of consultations as 

well as descriptions of past workup which she has received for her previous 

suspected syndrome of POTS.  Although patient's mass cell activation may 

account for some of the multi-system symptoms she chronically experiences (

abdominal discomfort, some food intolerances, some degree of her tachycardia), 

I believe that healthy skepticism of linking all of her systemic symptoms to 1 

syndrome may in fact do the patient a disservice if other underlying, deeper 

mental health issues are not explored with a mental health provider and 

potentially treated.  The patient was seen in consultation by her outpatient 

immunologist and she was continued on her continuous IV Benadryl drip, three 

times daily dosing of ranitidine, twice daily dosing of cetirizine, prednisone 

taper.  I discussed patient's presentation with our on-call hematologist and we 

both agreed that inpatient bone marrow biopsy is not medically indicated and 

elective 1 should be scheduled by the patient and her immunologist through the 

[facility] as an outpatient.  Purpose of such a bone marrow biopsy would be to 

ascertain whether the patient has evidence of more systemic mast cell 

activation.


4. Severe protein calorie malnutrition.  Evidenced by low BMI of 16.1 with 

limited oral intake and dietary consultation confirming the diagnosis.  Patient 

advanced her diet as tolerated and she was tolerating adequate oral intake at 

time of discharge.


5. Hypokalemia.  Secondary to poor oral intake, this has resolved, she will be 

discharged home on oral liquid potassium supplement.


6. Vitamin-D deficiency.  Patient was started on high dose vitamin-D therapy, 

once weekly, continues outpatient for additional 6 weeks and then go to daily 

therapy.


7. Nonpathologic tachycardia.  This is most likely secondary to a combination 

of BMI as well as possible underlying dysautonomia syndrome and is not a 

reflection of her overall volume status.  The patient's heart rate fluctuated 

between 50 and 110 on the 24 hours preceding discharge and these fluctuations 

do not appear to be associated with hypovolemia.  It is unclear whether the 

patient's symptoms of dizziness are truly associated with her tachycardia and 

either way there is no further inpatient evaluation indicated given that she is 

safe for ambulation and not experiencing a significant cardiac arrhythmia.


8. Chronic generalized weakness.  This is most likely secondary to a 

combination of poor oral intake, possible mass cell immunologic phenomena, 

potential underlying under treated mental health issues, and deconditioning in 

the setting of all of these.  The patient was seen extensively by physical and 

occupational therapy, they provided the patient with detailed instructions for 

safe transfers and mobility.  The patient will initiate mobility in a 

wheelchair and will advance as tolerates.  She will have home PT and OT ordered 

discharge. She does not require further inpatient workup or treatment.  Of note

, syndromes such as POTS or ones such as mass cell activation syndrome which 

are not entirely understood in their clinical relevance of pathology findings 

such as the ones listed above usually do have an underlying mental health 

component and patient's may benefit from being initiated on pharmacologic 

therapy.  I will defer this to her outpatient providers but I have encouraged 

the patient to consider this possibility. At the present time, she is finding 

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 

encouraging and motivating that it is being aggressively treated. That said, 

her singular focus on this diagnosis may only strengthen resistance to mental 

health support, if only to assist with the chronic medicalization that will 

likely result. 





DISCHARGE MEDICATIONS:


Please see official discharge medication reconciliation sheet in chart , 

pantoprazole once daily, continue all other medications.





DISCHARGE INSTRUCTIONS:


I would recommend seriously considering the risks of indwelling PICC line 

including DVT and pulmonary embolism versus the possible benefits of continuous 

Benadryl infusion, and I would recommend routine Re-consideration of removing 

this PICC line in the outpatient setting.  This has been discussed with the 

patient.





TIME SPENT:


Greater than 30 minutes were spent on direct patient care, as well as discharge 

planning and preparation.

## 2017-03-17 NOTE — PDIAF
- Diagnosis


Diagnosis: Duodenitis, possible mast cell activation syndrome


Code Status: Full Code





- Medication Management


Discharge Medications: 


 Medications to Continue on Transfer





Cetirizine HCl [Zyrtec] 10 mg PO BID 01/09/17 [Last Taken 03/06/17]


Ranitidine HCl [Zantac] 150 mg PO TID 01/09/17 [Last Taken 03/06/17]


predniSONE 20 mg PO BID@07,18 01/17/17 [Last Taken 03/06/17 07:00]


EPINEPHRINE [EPIPEN] 0.3 mg IM PRN PRN 01/21/17 [Last Taken 01/21/17 13:15]


Ibuprofen [Motrin (*)] 600 mg PO TID 01/21/17 [Last Taken 03/06/17 600 MG]


diphenhydrAMINE [Benadryl Injection] 0 mg IV CONT 03/06/17 [Last Taken 03/06/17]


predniSONE 30 mg PO DAILY@23 03/06/17 [Last Taken 03/05/17]


Bmx Susp (Benadryl,Maalox,Xylo) 5 ml PO TIDMEAL 03/07/17 [Last Taken 03/06/17]


Ergocalciferol [Vitamin D2 (*)] 50,000 i.unit PO FR #6 cap 03/17/17 [Last Taken 

Unknown]


Pantoprazole Sodium [Protonix 40mg (*)] 40 mg PO DAILY #30 tab 03/17/17 [Last 

Taken Unknown]


Potassium Chloride Po [Potassium Chloride 20 mg/15 ml (*)] 20 meq PO DAILY #30 

udcup 03/17/17 [Last Taken Unknown]


diphenhydrAMINE [Benadryl Injection] 200 mg IV CONT  inj 03/17/17 [Last Taken 

Unknown]








Long Term Antibiotics: NA


Discharge Medications: Refer to the Discharge Home Medication list for PRN 

reason.


PICC Care - Routine: Yes





- Orders


Services needed: Home Care, Registered Nurse, Physical Therapy, Occupational 

Therapy


Home Care Face to Face: I certify that this patient was under my care and that 

I had the required face-to-face encounter meeting the encounter requirements on 

the discharge day.  My findings support the fact that the patient is homebound 

as defined in CMS Chapter 7 Medicare Benefits Manual 30.1.1, The condition of 

the patient is such that there exists a normal inability to leave home and 

consequently, leaving home would require a considerable and taxing effort.


Diet Recommendation: other (eat solids/liquids which are tolerated)


Diet Texture: Regular Texture Diet


[place]: Not applicable


Activity/Weight Bearing Restrictions: as tolerates





- Follow Up Care


Current Providers and Referrals: 


Dinorah José MD [Medical Doctor] - 


Jason Bianchi MD [Primary Care Provider] - As per Instructions

## 2017-03-31 NOTE — EDPHY
H & P


Stated Complaint: thinks she has white sediment in picc line


Time Seen by Provider: 03/31/17 18:12


HPI/ROS: 





CHIEF COMPLAINT:  Evaluation of PICC line





HISTORY OF PRESENT ILLNESS:  Patient has a PICC line secondary to a chronic 

mass cell disorder where she receives chronic Benadryl infusion.  She presents 

to the ED today after she noticed a fine adherent material to the proximal 

aspect of the inner lumen of the PICC line.  She denies fever, redness, pain or 

additional acute complaints.  Her PICC line has been present since January.





REVIEW OF SYSTEMS:


A comprehensive 10 point review of systems is otherwise negative aside from 

elements mentioned in the history of present illness.


Source: Patient


Exam Limitations: No limitations





- Personal History


LMP (Females 10-55): Irregular


Current Tetanus/Diphtheria Vaccine: Yes





- Medical/Surgical History


Hx Asthma: No


Hx Chronic Respiratory Disease: No


Hx Diabetes: No


Hx Cardiac Disease: No


Hx Renal Disease: No


Hx Cirrhosis: No


Hx Alcoholism: No


Hx HIV/AIDS: No


Hx Splenectomy or Spleen Trauma: No


Other PMH: postural tachycardia syndrome, mast cell dysfunction, postural 

hypotension, allergic to dairy, soy, shellfish, bananas and citrus. Sensitivity 

to light and noise stimulation./duodenitis





- Social History


Smoking Status: Never smoked





- Physical Exam


Exam: 





General Appearance:  Alert, no distress


Skin:  Warm and dry, no rashes


Musculoskeletal:  Neck is supple nontender


Extremities:  PICC line noted to the right upper extremity, fibrinous fine 

material noted in the inner lumen consistent with mild fibrin deposition.  

There is no evidence of a fibrin clot or malfunction of the PICC line.  There 

is no evidence of a DVT, cellulitis or systemic infection.





Constitutional: 


 Initial Vital Signs











Temperature (C)  36.8 C   03/31/17 18:07


 


Heart Rate  130 H  03/31/17 18:07


 


Respiratory Rate  20   03/31/17 18:07


 


Blood Pressure  120/91 H  03/31/17 18:07


 


O2 Sat (%)  98   03/31/17 18:07








 











O2 Delivery Mode               Room Air














Allergies/Adverse Reactions: 


 





metronidazole [From Flagyl] Allergy (Severe, Verified 03/31/17 18:06)


 Other-Enter Comments


acetaminophen [From Vicodin] Allergy (Verified 03/31/17 18:06)


 


aspirin Allergy (Verified 03/31/17 18:06)


 


hydrocodone bitartrate [From Vicodin] Allergy (Verified 03/31/17 18:06)


 


morphine Allergy (Verified 03/31/17 18:06)


 








Home Medications: 














 Medication  Instructions  Recorded


 


Cetirizine HCl [Zyrtec] 10 mg PO BID 01/09/17


 


Ranitidine HCl [Zantac] 150 mg PO TID 01/09/17


 


predniSONE 20 mg PO BID@07,18 01/17/17


 


EPINEPHRINE [EPIPEN] 0.3 mg IM PRN PRN 01/21/17


 


Ibuprofen [Motrin (*)] 600 mg PO TID 01/21/17


 


diphenhydrAMINE [Benadryl 0 mg IV CONT 03/06/17





Injection]  


 


predniSONE 30 mg PO DAILY@23 03/06/17


 


Bmx Susp (Benadryl,Maalox,Xylo) 5 ml PO TIDMEAL 03/07/17


 


Ergocalciferol [Vitamin D2 (*)] 50,000 i.unit PO FR #6 cap 03/17/17


 


Pantoprazole Sodium [Protonix 40mg 40 mg PO DAILY #30 tab 03/17/17





(*)]  


 


Potassium Chloride Po [Potassium 20 meq PO DAILY #30 udcup 03/17/17





Chloride 20 mg/15 ml (*)]  


 


diphenhydrAMINE [Benadryl 200 mg IV CONT  inj 03/17/17





Injection]  














Medical Decision Making


Procedures: 





Patient has some mild fibrinous material in her PICC line which is still 

functional.  There is no evidence of a obstructing fibrin clot.  At this point 

time I have no recommendations to change the PICC line.  There is no clinical 

evidence of a PE or DVT.  I have told the patient to continue her typical 

therapy.  The patient should return to the ED for malfunction of the PICC line, 

chest pain, fever, redness, pain or other concerns.





Departure





- Departure


Disposition: Home, Routine, Self-Care


Clinical Impression: 


 Mast cell disease





Condition: Good


Instructions:  Peripherally Inserted Central Catheters and Midline Catheters (ED

)


Additional Instructions: 


1.  Please return to the emergency department for fever, pain, redness, 

difficulty breathing, malfunction of the PICC line or other concerns.


2.  Please follow up as scheduled with your primary care provider and allergy 

immunology specialist


Referrals: 


Jason Bianchi MD [Primary Care Provider] - As per Instructions

## 2017-04-18 NOTE — EDPHY
H & P


Time Seen by Provider: 04/18/17 16:49


HPI/ROS: 





Chief complaint:  Abdominal pain





History of present illness:  This is a 37-year-old female brought to the 

emergency department by EMS for evaluation of abdominal pain.  Patient is 

currently under treatment for mast cell disorder.  She is currently on an IV 

drip of Benadryl.  She was recently on ibuprofen but because of a history of 

gastritis and a recent decrease in hemoglobin and hematocrit all NSAIDs were 

stopped.  She states she has had similar epigastric pain in the past but it 

only lasted for 10-20 minutes. Her current pain has been persistent for the 

last 3 hours.  She states the pain is in the epigastric and right side of the 

abdomen.  It is a bandlike pain.  She does report radiation of pain to the back 

between the scapula.  She denies other associated signs or symptoms including 

no fevers, no nausea or vomiting, no diarrhea, no urinary symptoms.





Review of systems:  A 10 point review of systems was obtained and other than 

described above was negative





- Medical/Surgical History


Hx Asthma: No


Hx Chronic Respiratory Disease: No


Hx Diabetes: No


Hx Cardiac Disease: No


Hx Renal Disease: No


Hx Cirrhosis: No


Hx Alcoholism: No


Hx HIV/AIDS: No


Hx Splenectomy or Spleen Trauma: No


Other PMH: postural tachycardia syndrome, mast cell dysfunction, postural 

hypotension, allergic to dairy, soy, shellfish, bananas and citrus. Sensitivity 

to light and noise stimulation./duodenitis





- Social History


Smoking Status: Never smoked





- Physical Exam


Exam: 





General Appearance:  Alert, nontoxic.


Eyes:  Pupils equal and round no pallor or injection.


ENT, Mouth:  Mucous membranes moist.


Respiratory:  There are no retractions, lungs are clear to auscultation.


Cardiovascular:  Regular rate and rhythm.


Gastrointestinal:  Abdomen is soft and nontender, no masses, bowel sounds 

normal.


Neurological:  Alert and oriented x4. Strength and sensation intact and 

symmetrical.


Skin:  Warm and dry, no rashes.


Musculoskeletal:  Neck is supple nontender. Extremities are symmetrical, full 

range of motion.


Psychiatric:  Patient is oriented X 3, there is no agitation.





Constitutional: 


 Initial Vital Signs











Temperature (C)  37.0 C   04/18/17 16:48


 


Heart Rate  102 H  04/18/17 16:48


 


Respiratory Rate  15   04/18/17 16:48


 


Blood Pressure  105/78   04/18/17 16:48


 


O2 Sat (%)  98   04/18/17 16:48








 











O2 Delivery Mode               Room Air














Allergies/Adverse Reactions: 


 





metronidazole [From Flagyl] Allergy (Severe, Verified 03/31/17 18:06)


 Other-Enter Comments


acetaminophen [From Vicodin] Allergy (Verified 03/31/17 18:06)


 


aspirin Allergy (Verified 03/31/17 18:06)


 


hydrocodone bitartrate [From Vicodin] Allergy (Verified 03/31/17 18:06)


 


morphine Allergy (Verified 03/31/17 18:06)


 








Home Medications: 














 Medication  Instructions  Recorded


 


Cetirizine HCl [Zyrtec] 10 mg PO BID 01/09/17


 


Ranitidine HCl [Zantac] 150 mg PO TID 01/09/17


 


predniSONE 20 mg PO BID@07,18 01/17/17


 


EPINEPHRINE [EPIPEN] 0.3 mg IM PRN PRN 01/21/17


 


Ibuprofen [Motrin (*)] 600 mg PO TID 01/21/17


 


diphenhydrAMINE [Benadryl 0 mg IV CONT 03/06/17





Injection]  


 


predniSONE 30 mg PO DAILY@23 03/06/17


 


Bmx Susp (Benadryl,Maalox,Xylo) 5 ml PO TIDMEAL 03/07/17


 


Ergocalciferol [Vitamin D2 (*)] 50,000 i.unit PO FR #6 cap 03/17/17


 


Pantoprazole Sodium [Protonix 40mg 40 mg PO DAILY #30 tab 03/17/17





(*)]  


 


Potassium Chloride Po [Potassium 20 meq PO DAILY #30 udcup 03/17/17





Chloride 20 mg/15 ml (*)]  


 


diphenhydrAMINE [Benadryl 200 mg IV CONT  inj 03/17/17





Injection]  














Medical Decision Making





- Diagnostics


Imaging Results: 


 Imaging Impressions





Abdomen Ultrasound  04/18/17 17:01


Impression:


1. Negative for cholelithiasis or secondary findings of cholecystitis.


2. Minimal right pleural effusion is noted.


 


Results called and discussed with MONA Miranda on 4/18/2017 at 18:32  


 


 











ED Course/Re-evaluation: 





Patient is discussed with my secondary supervising physician Dr. Byron Barrios.

  Patient presents to the emergency department for evaluation of abdominal 

pain.  She is nontoxic.  Afebrile and vital signs are stable.  Blood studies 

unremarkable. Ultrasound unremarkable.  Urine dip unremarkable.  Patient is 

treated with IV fentanyl with resolution of pain. She is requesting her nightly 

dose of prednisone, 20 mg, which she is given. On re-evaluation she is feeling 

better.  She is declining further medication.  This does appear to be related 

to her mast cell disease, she has had similar problems in the past.  I have 

discussed the patient with her primary care doctor/allergist Dr. Dinorah José.

  She is comfortable with patient being discharged home and following up in 

clinic.  Home care is discussed with the Patient. Strict return precautions are 

given.  Patient voiced understanding and agreement with plan.


Differential Diagnosis: 





Included but not limited to problems associated with her mast cell disease, 

gastritis, gastroenteritis, biliary tract disease, pancreatitis, urinary tract 

disease





- Data Points


Laboratory Results: 


 Laboratory Results





 04/18/17 17:20 





 04/18/17 17:20 





 











  04/18/17 04/18/17 04/18/17





  17:20 17:20 17:20


 


WBC      11.74 10^3/uL H 10^3/uL





     (3.80-9.50) 


 


RBC      3.94 10^6/uL L 10^6/uL





     (4.18-5.33) 


 


Hgb      11.3 g/dL L g/dL





     (12.6-16.3) 


 


Hct      34.7 % L %





     (38.0-47.0) 


 


MCV      88.1 fL fL





     (81.5-99.8) 


 


MCH      28.7 pg pg





     (27.9-34.1) 


 


MCHC      32.6 g/dL g/dL





     (32.4-36.7) 


 


RDW      18.1 % H %





     (11.5-15.2) 


 


Plt Count      470 10^3/uL H D 10^3/uL





     (150-400) 


 


MPV      8.4 fL L fL





     (8.7-11.7) 


 


Neut % (Auto)      Not Reported 





    


 


Lymph % (Auto)      Not Reported 





    


 


Mono % (Auto)      Not Reported 





    


 


Eos % (Auto)      Not Reported 





    


 


Baso % (Auto)      Not Reported 





    


 


Nucleat RBC Rel Count      0.5 % H %





     (0.0-0.2) 


 


Absolute Neuts (auto)      Not Reported 





    


 


Absolute Lymphs (auto)      Not Reported 





    


 


Absolute Monos (auto)      Not Reported 





    


 


Absolute Eos (auto)      Not Reported 





    


 


Absolute Basos (auto)      Not Reported 





    


 


Absolute Nucleated RBC      0.06 10^3/uL H 10^3/uL





     (0-0.01) 


 


Immature Gran %      Not Reported 





    


 


Seg Neutrophils %      46 % %





    


 


Band Neutrophils %      13 % %





    


 


Lymphocytes %      37 % %





    


 


Monocytes %      4 % %





    


 


Immature Gran #      Not Reported 





    


 


Absolute Seg Neuts      5.40 10^/uL 10^/uL





     (1.70-6.50) 


 


Absolute Band Neuts      1.53 10^3/uL H 10^3/uL





     (0.00-0.70) 


 


Absolute Lymphocytes      4.34 10^3/uL H 10^3/uL





     (1.00-3.00) 


 


Absolute Monocytes      0.47 10^3/uL 10^3/uL





     (0.30-0.80) 


 


Platelet Estimate      INCREASED  H 





     (ADEQ) 


 


Polychromasia      2+  H 





    


 


Hypochromasia      1+  H 





    


 


Microcytic Cells      2+  H 





    


 


Stomatocytes      1+  H 





    


 


Smear Review By      Pending 





    


 


Sodium    139 mEq/L mEq/L  





    (134-144)  


 


Potassium    3.9 mEq/L mEq/L  





    (3.5-5.2)  


 


Chloride    99 mEq/L mEq/L  





    ()  


 


Carbon Dioxide    28 mEq/l mEq/l  





    (22-31)  


 


Anion Gap    12 mEq/L mEq/L  





    (8-16)  


 


BUN    17 mg/dL mg/dL  





    (7-23)  


 


Creatinine    0.5 mg/dL L mg/dL  





    (0.6-1.0)  


 


Estimated GFR    > 60   





    


 


Glucose    81 mg/dL mg/dL  





    ()  


 


Calcium    9.9 mg/dL mg/dL  





    (8.5-10.4)  


 


Total Bilirubin    0.5 mg/dL mg/dL  





    (0.1-1.4)  


 


Conjugated Bilirubin    0.3 mg/dL mg/dL  





    (0.0-0.5)  


 


Unconjugated Bilirubin    0.2 mg/dL mg/dL  





    (0.0-1.1)  


 


AST    16 IU/L IU/L  





    (14-46)  


 


ALT    30 IU/L IU/L  





    (9-52)  


 


Alkaline Phosphatase    42 IU/L IU/L  





    ()  


 


Total Protein    7.2 g/dL g/dL  





    (6.3-8.2)  


 


Albumin    4.5 g/dL g/dL  





    (3.5-5.0)  


 


Lipase    375.0 IU/L H IU/L  





    ()  


 


Beta HCG, Qual  NEGATIVE     





    











Medications Given: 


 








Discontinued Medications





Al Hydroxide/Mg Hydroxide (Maalox Susp)  30 ml PO ONCE ONE


   Stop: 04/18/17 18:20


   Last Admin: 04/18/17 18:44 Dose:  Not Given


Fentanyl (Sublimaze)  100 mcg IVP EDNOW ONE


   Stop: 04/18/17 17:41


   Last Admin: 04/18/17 17:44 Dose:  100 mcg


Hyoscyamine Sulfate (Levsin, Hyomax-Sl)  0.25 mg PO ONCE ONE


   Stop: 04/18/17 18:20


   Last Admin: 04/18/17 18:43 Dose:  Not Given


Sodium Chloride (Ns)  1,000 mls @ 0 mls/hr IV ONCE ONE


   PRN Reason: Wide Open


   Stop: 04/18/17 17:01


   Last Admin: 04/18/17 17:44 Dose:  1,000 mls


Lidocaine (Lidocaine 2% Viscous)  15 ml PO ONCE ONE


   Stop: 04/18/17 18:20


   Last Admin: 04/18/17 18:44 Dose:  Not Given


Prednisone (Prednisone)  20 mg PO EDNOW ONE


   Stop: 04/18/17 17:53


   Last Admin: 04/18/17 18:06 Dose:  20 mg








Departure





- Departure


Disposition: Home, Routine, Self-Care


Clinical Impression: 


 Abdominal pain





Condition: Good


Instructions:  Abdominal Pain (ED)


Additional Instructions: 


Follow-up with your primary care doctor for continued evaluation and care this 

week





If symptoms worsen or new symptoms develop return to the emergency room for 

recheck


Referrals: 


Patient,NotPresent [Unknown] - As per Instructions


Dinorah José MD [[facility] Primary Care Provider] - As per Instructions

## 2017-05-04 NOTE — EDPHY
H & P


Stated Complaint: possible allergic reaction to solu-medrol, became diaphoretic

, chest tight


Time Seen by Provider: 17 12:55





- Personal History


LMP (Females 10-55): Now





- Medical/Surgical History


Hx Asthma: No


Hx Chronic Respiratory Disease: No


Hx Diabetes: No


Hx Cardiac Disease: No


Hx Renal Disease: No


Hx Cirrhosis: No


Hx Alcoholism: No


Hx HIV/AIDS: No


Hx Splenectomy or Spleen Trauma: No


Other PMH: postural tachycardia syndrome, mast cell dysfunction, postural 

hypotension, allergic to dairy, soy, shellfish, bananas and citrus. Sensitivity 

to light and noise stimulation./duodenitis





- Social History


Smoking Status: Never smoked


Constitutional: 


 Initial Vital Signs











Temperature (C)  37.1 C   17 12:33


 


Heart Rate  116 H  17 12:33


 


Respiratory Rate  15   17 12:33


 


Blood Pressure  122/76 H  17 12:33


 


O2 Sat (%)  98   17 12:33








 











O2 Delivery Mode               Room Air














Allergies/Adverse Reactions: 


 





metronidazole [From Flagyl] Allergy (Severe, Verified 17 18:06)


 Other-Enter Comments


acetaminophen [From Vicodin] Allergy (Verified 17 18:06)


 


aspirin Allergy (Verified 17 18:06)


 


hydrocodone bitartrate [From Vicodin] Allergy (Verified 17 18:06)


 


methylprednisolone [From Solu-Medrol] Allergy (Verified 17 12:33)


 


morphine Allergy (Verified 17 18:06)


 








Home Medications: 














 Medication  Instructions  Recorded


 


Cetirizine HCl [Zyrtec] 10 mg PO BID 17


 


Ranitidine HCl [Zantac] 150 mg PO TID 17


 


predniSONE 20 mg PO BID@07,18 17


 


EPINEPHRINE [EPIPEN] 0.3 mg IM PRN PRN 17


 


Ibuprofen [Motrin (*)] 600 mg PO TID 17


 


diphenhydrAMINE [Benadryl 0 mg IV CONT 17





Injection]  


 


predniSONE 30 mg PO DAILY@23 17


 


Bmx Susp (Benadryl,Maalox,Xylo) 5 ml PO TIDMEAL 17


 


Ergocalciferol [Vitamin D2 (*)] 50,000 i.unit PO FR #6 cap 17


 


Pantoprazole Sodium [Protonix 40mg 40 mg PO DAILY #30 tab 17





(*)]  


 


Potassium Chloride Po [Potassium 20 meq PO DAILY #30 udcup 17





Chloride 20 mg/15 ml (*)]  


 


diphenhydrAMINE [Benadryl 200 mg IV CONT  inj 17





Injection]  














Medical Decision Making


ED Course/Re-evaluation: 





CHIEF COMPLAINT:  Allergic reaction, mast cell activation disease. 





HISTORY OF PRESENT ILLNESS:  The patient is a 37-year-old female diagnosed with 

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 

allergic reaction. She woke up tachycardic and diaphoretic this morning and has 

had a largely variable heart rate, which is unusual for her. She is usually 

tachycardic but felt palpitations this morning. She denies fever, recent 

sickness, vomiting, diarrhea, or other complaints. She is on continuous 

Benadryl and Pepcid drips. She is currently taking 60mg Prednisone. 





REVIEW OF SYSTEMS:  





A 10 point review of systems was performed and is negative with the exception 

of the elements mentioned in the history of present illness.





PHYSICAL EXAM:  





HR, BP, O2 Sat, RR.  Temp noted


General Appearance:  Alert, well hydrated, appropriate, and non-toxic appearing.


Head:  Atraumatic without scalp tenderness or obvious injury


Eyes:  Pupils equal, round, reactive to light and accommodation, EOMI, no trauma

, no injection.


Ears:  Clear bilaterally, no perforation, normal landmarks


Nose:  Atraumatic, no rhinorrhea, clear.


Throat:  There is no erythema or exudates, no lesions, normal tonsils, mucus 

membranes moist.


Neck:  Supple, 2+ carotid upstroke, nontender, no lymphadenopathy.


Respiratory:  No retractions, no distress, no wheezes, and no accessory muscle 

use.  Lungs are clear to auscultation bilaterally.


Cardiovascular:  Regular tachycardia, no murmurs, rubs, or gallops. Bilateral 

carotid, radial, dorsalis pedis, and posterior tibial pulses intact. Good 

capillary refill all extremities.


Gastrointestinal:  Abdomen is soft, nontender, non-distended, no masses, no 

rebound, no guarding, no peritoneal signs.


Musculoskeletal:  Normal active ROM of all extremities, atraumatic.


Neurological:  Alert, appropriate, and interactive.  The patient has normal 

DTRs and non-focal cranial nerves, motor, sensory, and cerebellar exam.


Skin:  No rashes, good turgor, no nodules on palpation.





Past medical history: Postural tachycardia syndrome, mast cell dysfunction, 

postural hypotension, duodenitis.


Past surgical history: Denies.


Family history: N/A. 


Social history: Here with mother. 





DIAGNOSTICS/PROCEDURES/CRITICAL CARE TIME:  


The 12 lead EKG was interpreted by myself. See hard copy and/or "[email or web address]" 

electronic copy for interpretation.





DIFFERENTIAL DIAGNOSIS:   


The differential diagnosis included but was not limited to angioedema, 

anaphylaxis, anaphylactoid reaction, urticarial reaction, and other infectious 

causes for skin rash.





MEDICAL DECISION MAKIN-year-old female with a history of mast cell activation disease presents 

tachycardic and diaphoretic. She describes a near-syncopal episode this morning 

which is unusual for her with her condition. She is on continuous Pepcid and 

Benadryl drips. She is also on 20mg Prednisone TID. She is regularly 

tachycardic but states that earlier this morning her heart rate fluctuated from 

. An IV was established. We will check electrolytes including magnesium 

and basic blood work. 





I researched the patient's current medicines but was unable to find any drug 

interactions that would cause dysrhythmias. 





I reviewed the patient's laboratory studies. Her chronic anemia is slightly 

worse today. This could explain her tachycardia but not her bradycardia. Lab 

work is otherwise unremarkable. I have paged Dr. Ortiz for Holter Monitor follow 

up. 





1629: Consulted with Dr. Ortiz, cardiology. He will call the patient to set up 

Holter Monitor and see her in his office. 





1637: Reassessed patient. Discussed the results of her workup. We also 

discussed my conversation with Dr. Ortiz and the plan for Holter Monitor. She is 

comfortable with the plan. 





- Data Points


Laboratory Results: 


 Laboratory Results





 17 14:33 





 17 14:33 





 











  17





  14:33 14:33


 


WBC    8.49 10^3/uL 10^3/uL





    (3.80-9.50) 


 


RBC    3.37 10^6/uL L 10^6/uL





    (4.18-5.33) 


 


Hgb    9.4 g/dL L g/dL





    (12.6-16.3) 


 


Hct    29.7 % L %





    (38.0-47.0) 


 


MCV    88.1 fL fL





    (81.5-99.8) 


 


MCH    27.9 pg pg





    (27.9-34.1) 


 


MCHC    31.6 g/dL L g/dL





    (32.4-36.7) 


 


RDW    16.9 % H %





    (11.5-15.2) 


 


Plt Count    292 10^3/uL 10^3/uL





    (150-400) 


 


MPV    8.7 fL fL





    (8.7-11.7) 


 


Neut % (Auto)    80.0 % H %





    (39.3-74.2) 


 


Lymph % (Auto)    13.2 % L %





    (15.0-45.0) 


 


Mono % (Auto)    4.9 % %





    (4.5-13.0) 


 


Eos % (Auto)    0.0 % L %





    (0.6-7.6) 


 


Baso % (Auto)    0.0 % L %





    (0.3-1.7) 


 


Nucleat RBC Rel Count    0.0 % %





    (0.0-0.2) 


 


Absolute Neuts (auto)    6.79 10^3/uL H 10^3/uL





    (1.70-6.50) 


 


Absolute Lymphs (auto)    1.12 10^3/uL 10^3/uL





    (1.00-3.00) 


 


Absolute Monos (auto)    0.42 10^3/uL 10^3/uL





    (0.30-0.80) 


 


Absolute Eos (auto)    0.00 10^3/uL L 10^3/uL





    (0.03-0.40) 


 


Absolute Basos (auto)    0.00 10^3/uL L 10^3/uL





    (0.02-0.10) 


 


Absolute Nucleated RBC    0.00 10^3/uL 10^3/uL





    (0-0.01) 


 


Immature Gran %    1.9 % H %





    (0.0-1.1) 


 


Immature Gran #    0.16 10^3/uL H 10^3/uL





    (0.00-0.10) 


 


Sodium  140 mEq/L mEq/L  





   (134-144)  


 


Potassium  4.4 mEq/L mEq/L  





   (3.5-5.2)  


 


Chloride  105 mEq/L mEq/L  





   ()  


 


Carbon Dioxide  26 mEq/l mEq/l  





   (22-31)  


 


Anion Gap  9 mEq/L mEq/L  





   (8-16)  


 


BUN  18 mg/dL mg/dL  





   (7-23)  


 


Creatinine  0.5 mg/dL L mg/dL  





   (0.6-1.0)  


 


Estimated GFR  > 60   





   


 


Glucose  120 mg/dL H mg/dL  





   ()  


 


Calcium  9.2 mg/dL mg/dL  





   (8.5-10.4)  


 


Magnesium  2.4 mg/dL H mg/dL  





   (1.6-2.3)  











Medications Given: 


 








Discontinued Medications





Ibuprofen (Motrin)  800 mg PO EDNOW ONE


   Stop: 17 13:48


   Last Admin: 17 13:55 Dose:  800 mg








Departure





- Departure


Disposition: Home, Routine, Self-Care


Clinical Impression: 


 Tachycardia





Anemia


Qualifiers:


 Anemia type: unspecified type Qualified Code(s): D64.9 - Anemia, unspecified





Arrhythmia


Qualifiers:


 Arrhythmia type: unspecified cardiac arrhythmia Qualified Code(s): I49.9 - 

Cardiac arrhythmia, unspecified





Condition: Good


Instructions:  Anemia (ED), Tachycardia  (ED)


Additional Instructions: 


You can expect a call from the office of Dr. Ortiz, cardiology, for follow up. 

You have been provided his telephone number if you don't hear back. 





Return for any serious worsening of condition. 


Referrals: 


Dinorah José MD [[facility] Primary Care Provider] - As per Instructions


Gerald Ortiz MD [Medical Doctor] - As per Instructions


Report Scribed for: Sam Tucker


Report Scribed by: Royal Oconnor


Date of Report: 17


Time of Report: 13:21

## 2017-05-04 NOTE — CPEKG
Heart Rate: 101

RR Interval: 594

P-R Interval: 130

QRSD Interval: 78

QT Interval: 316

QTC Interval: 410

P Axis: 47

QRS Axis: -22

T Wave Axis: 88

EKG Severity - OTHERWISE NORMAL ECG -

EKG Impression: SINUS TACHYCARDIA

EKG Impression: BORDERLINE LEFT AXIS DEVIATION

Electronically Signed By: Sam Tucker 04-May-2017 20:26:03

## 2017-06-01 NOTE — EDPHY
H & P


Stated Complaint: States abnl labs from home care;?szs x 2 wks poss R/T mast 

cell disorder


Time Seen by Provider: 06/01/17 13:40


HPI/ROS: 





CHIEF COMPLAINT:  Anemia, weakness





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 

history of chronic disease.  She has a history of mast cell activation syndrome 

and is on a chronic Benadryl and ranitidine infusion.  She carries pumps with 

her.  She also has postural orthostatic tachycardia syndrome and postural 

hypotension.  She also has chronic tachycardia.  She also has seizures which 

are controlled fairly well with her ranitidine infusion.  Over the last 5 

months her hematocrit has dropped from 40-24.  They have not found any source 

of bleeding.  She provided a stool sample today for Hemoccult testing.  Her 

doctor Dr. Dinorah José in conjunction with her oncologist Dr. Jaron Archuleta 

have sent her here to the emergency department for admission and likely for 

iron and B12 infusions.  She is afebrile.  She currently is not having any 

pain.  No vomiting or diarrhea.  She states that she has not had any blood in 

her stool.  She has not had a menses in over a year.  She states that her 

symptoms have been remarkably controlled since she has been on the infusions.  

She also takes steroids periodically .








REVIEW OF SYSTEMS:


Constitutional:  denies: chills, fever, recent illness, recent injury


EENTM: denies: blurred vision, double vision, nose congestion


Respiratory: denies: cough, shortness of breath


Cardiac: denies: chest pain, irregular heart rate, lightheadedness, palpitations


Gastrointestinal/Abdominal: denies: abdominal pain, diarrhea, nausea, vomiting, 

blood streaked stools


Genitourinary: denies: dysuria, frequency, hematuria, pain


Musculoskeletal: denies: joint pain, muscle pain


Skin: denies: lesions, rash, jaundice, bruising


Neurological: denies: headache, numbness, paresthesia, tingling, dizziness, 

weakness


Hematologic/Lymphatic: denies: blood clots, easy bleeding, easy bruising


Immunologic/allergic: denies: HIV/AIDS, transplant








EXAM:


GENERAL:  Pale appearing and in no acute distress.


HEAD:  Atraumatic, normocephalic.


EYES:  Pupils equal round and reactive to light, extraocular movements intact, 

sclera anicteric, conjunctiva are normal.


ENT:  TMs normal, nares patent, oropharynx clear without exudates.  Moist 

mucous membranes.


NECK:  Normal range of motion, supple without lymphadenopathy or JVD.


LUNGS:  Breath sounds clear to auscultation bilaterally and equal.  No wheezes 

rales or rhonchi.


HEART:  Regular rate and rhythm without murmurs, rubs or gallops.


ABDOMEN:  Soft, nontender, normoactive bowel sounds.  No guarding, no rebound.  

No masses appreciated.


BACK:  No CVA tenderness, no spinal tenderness, step-offs or deformities


EXTREMITIES:  Normal range of motion, no pitting or edema.  No clubbing or 

cyanosis.


NEUROLOGICAL:  Cranial nerves II through XII grossly intact.  Normal speech, 

normal gait.  5/5 strength, normal movement in all extremities, normal sensation


PSYCH:  Normal mood, normal affect.


SKIN:  Warm, dry, normal turgor, no visible rashes or lesions.








Source: Patient


Exam Limitations: No limitations





- Personal History


LMP (Females 10-55): Irregular


Current Tetanus Diphtheria and Acellular Pertussis (TDAP): No


Tetanus Vaccine Date: Contrainicated by MAST cell disorder





- Medical/Surgical History


Hx Asthma: No


Hx Chronic Respiratory Disease: No


Hx Diabetes: No


Hx Cardiac Disease: No


Hx Renal Disease: No


Hx Cirrhosis: No


Hx Alcoholism: No


Hx HIV/AIDS: No


Hx Splenectomy or Spleen Trauma: No


Other PMH: postural tachycardia syndrome, mast cell dysfunction, postural 

hypotension, allergic to dairy, soy, shellfish, bananas and citrus. Sensitivity 

to light and noise stimulation./duodenitis





- Family History


Significant Family History: No pertinent family hx





- Social History


Smoking Status: Never smoked


Alcohol Use: None


Drug Use: None


Constitutional: 


 Initial Vital Signs











Temperature (C)  37.1 C   06/01/17 13:30


 


Heart Rate  123 H  06/01/17 13:30


 


Respiratory Rate  20   06/01/17 13:30


 


Blood Pressure  126/88 H  06/01/17 13:30


 


O2 Sat (%)  98   06/01/17 13:30








 











O2 Delivery Mode               Room Air














Allergies/Adverse Reactions: 


 





metronidazole [From Flagyl] Allergy (Severe, Verified 06/01/17 13:30)


 Other-Enter Comments


acetaminophen [From Vicodin] Allergy (Verified 06/01/17 13:30)


 


aspirin Allergy (Verified 06/01/17 13:30)


 


hydrocodone bitartrate [From Vicodin] Allergy (Verified 06/01/17 13:30)


 


methylprednisolone [From Solu-Medrol] Allergy (Verified 06/01/17 13:30)


 


morphine Allergy (Verified 06/01/17 13:30)


 








Home Medications: 














 Medication  Instructions  Recorded


 


Cetirizine HCl [Zyrtec] 10 mg PO BID 01/09/17


 


Ranitidine HCl [Zantac] 150 mg PO TID 01/09/17


 


predniSONE 20 mg PO BID@07,18 01/17/17


 


EPINEPHRINE [EPIPEN] 0.3 mg IM PRN PRN 01/21/17


 


Ibuprofen [Motrin (*)] 600 mg PO TID 01/21/17


 


diphenhydrAMINE [Benadryl 0 mg IV CONT 03/06/17





Injection]  


 


predniSONE 30 mg PO DAILY@23 03/06/17


 


Bmx Susp (Benadryl,Maalox,Xylo) 5 ml PO TIDMEAL 03/07/17


 


Ergocalciferol [Vitamin D2 (*)] 50,000 i.unit PO FR #6 cap 03/17/17


 


Pantoprazole Sodium [Protonix 40mg 40 mg PO DAILY #30 tab 03/17/17





(*)]  


 


Potassium Chloride Po [Potassium 20 meq PO DAILY #30 udcup 03/17/17





Chloride 20 mg/15 ml (*)]  


 


diphenhydrAMINE [Benadryl 200 mg IV CONT  inj 03/17/17





Injection]  


 


Famotidine 20 mg/NaCl [Pepcid 20 20 mg IV 06/01/17





mg (Premix)]  














Medical Decision Making


ED Course/Re-evaluation: 





I discussed the case with Dr. Rivera who is on-call for Dr. Archuleta the patient'

s hematologist.  He states that  iron infusion is probably not indicated 

because her ferritin levels in March were 18 but it could be recheck.  He 

states that they do not have any previous lab values for her B12.  He 

recommends more of a blood transfusion but feels that it could happen in an 

outpatient setting tomorrow especially since will likely take a day to type and 

cross for the patient's blood match.  We discussed initiating that type and 

cross here as well as obtaining new ferritin and B12 levels in preparation for 

outpatient transfusion tomorrow on the 3rd floor.  I spoke with the patient who 

requested I speak with Dr. José before we proceed with this decision.  I 

have left a message on Dr. José cell phone and have tried calling her office.





2:30 p.m. I discussed the case with Dr. Dinorah José and then again with the 

patient.  We will obtain new ferritin, B12 levels and a type and cross in 

preparation for outpatient transfusion tomorrow with Dr. Archuleta's office on the 

3rd floor.  The patient and sister are happy with this plan.  They declined 

further workup or testing at this time.  The patient is currently not 

tachycardic


Differential Diagnosis: 





Partial list of the Differential diagnosis considered include but were not 

limited to;  anemia, autoimmune disease, mast cell activation, depression, and 

although unlikely based on the history and physical exam, I also considered GI 

hemorrhage, infection.  I discussed these differential diagnoses and the plan 

with the patient as well as the usual and expected course.  The patient 

understands that the diagnosis is provisional and that in medicine we are not 

always correct and that further workup is often warranted.  Usual and customary 

warnings were given.  All of the patient's questions were answered.  The 

patient was instructed to return to the emergency department should the 

symptoms at all worsen or return, otherwise to followup with the physician as 

we discussed.





- Data Points


Medications Given: 


 








Discontinued Medications





Famotidine (Pepcid)  40 mg IVP EDNOW ONE


   Stop: 06/01/17 15:00


   Last Admin: 06/01/17 15:00 Dose:  40 mg








Departure





- Departure


Disposition: Home, Routine, Self-Care


Clinical Impression: 


 Mast cell disease





Anemia


Qualifiers:


 Anemia type: unspecified type Qualified Code(s): D64.9 - Anemia, unspecified





Condition: Fair


Instructions:  Anemia (ED)


Referrals: 


Jason Bianchi MD [Primary Care Provider] - As per Instructions

## 2017-06-07 NOTE — EDPHY
H & P


Time Seen by Provider: 06/07/17 16:30


HPI/ROS: 





CHIEF COMPLAINT: "Allergic reaction"





HISTORY OF PRESENT ILLNESS:  37-year-old female history of mast cell activation 

syndrome , history of chronic and H1 H2 blocker infusion via PICC line, 

followed by Dr. Dinorah José, arrives by ambulance after family was giving her 

a B12 injection in her abdomen at 3:30 p.m. inpatient shortly thereafter 

developed allergic reaction like symptoms including hyperventilation, sensation 

of tonsillar glossal enlargement and dysphagia, localized rash.  She gave 

herself to EpiPen injections.  She is feeling improvement but is complaining of 

tremor and anxiety at this time.  Patient states that she is unable to tolerate 

IV Solu-Medrol, states that she has an allergy data Solu-Medrol and declined 

this by EMS EN route.





Upon arrival in the ER she is complaining tremor anxiety. She denies dysphagia, 

abdominal pain, dyspnea, wheezing.











REVIEW OF SYSTEMS:


A ten point review of systems was performed and is negative with the exception 

of the items mentioned in the HPI








PAST MEDICAL & SURGICAL  HISTORY:  Postural tachycardia syndrome.  Mast cell 

dysfunction.





SOCIAL HISTORY:  Nonsmoker.  previously worked as a nurse














************


PHYSICAL EXAM





(Prior to examination, patient consented to physical exam, hands were washed 

and my usual and customary physical exam procedures followed)


1) GENERAL: Well-developed, well-nourished, alert and oriented.  Appears anxious


2) HEAD: Normocephalic, atraumatic


3) HEENT: Pupils equal, round, reactive to light bilaterally.  Sclera 

anicteric.  Nasopharynx, oropharynx, clear, no lesions.  No tonsillar 

enlargement or exudate no posterior or pharyngeal erythema. Ears bilaterally 

with normal tympanic membranes.


4) NECK: Full range of motion, no meningeal signs.


5) LUNGS: Clear auscultation bilaterally, no wheezes, no rhonchi, no 

retractions.   


6) HEART: Regular rate and rhythm, no murmur, no heave, no gallop.


7) ABDOMEN: No guarding, no rebound, no focal tendernes, I have examined the 

injection sites and these appear well with mild ecchymosis at the injection 

site only with no signs of infection.


8) MUSCULOSKELETAL: right upper extremity PICC line in place with infusion pump 


9) BACK: no visual or palpable abnormality. 


10) SKIN:  no urticaria 


[11) Psychiatric:  Patient is oriented X 3, there is no agitation.








***************





DIFFERENTIAL DIAGNOSIS:   in no particular order including but not limited to 

anaphylactic reaction, anaphylactoid reaction, urticaria  








- Personal History


Tetanus Vaccine Date: Contrainicated by MAST cell disorder





- Medical/Surgical History


Hx Asthma: No


Hx Chronic Respiratory Disease: No


Hx Diabetes: No


Hx Cardiac Disease: No


Hx Renal Disease: No


Hx Cirrhosis: No


Hx Alcoholism: No


Hx HIV/AIDS: No


Hx Splenectomy or Spleen Trauma: No


Other PMH: postural tachycardia syndrome, mast cell dysfunction, postural 

hypotension, allergic to dairy, soy, shellfish, bananas and citrus. Sensitivity 

to light and noise stimulation./duodenitis





- Social History


Smoking Status: Never smoked


Constitutional: 


 Initial Vital Signs











Temperature (C)  36.3 C   06/07/17 16:27


 


Heart Rate  140 H  06/07/17 16:27


 


Respiratory Rate  20   06/07/17 16:27


 


Blood Pressure  134/81 H  06/07/17 16:27


 


O2 Sat (%)  100   06/07/17 16:27








 











O2 Delivery Mode               Room Air














Allergies/Adverse Reactions: 


 





metronidazole [From Flagyl] Allergy (Severe, Verified 06/01/17 13:30)


 Other-Enter Comments


acetaminophen [From Vicodin] Allergy (Verified 06/01/17 13:30)


 


aspirin Allergy (Verified 06/01/17 13:30)


 


hydrocodone bitartrate [From Vicodin] Allergy (Verified 06/01/17 13:30)


 


methylprednisolone [From Solu-Medrol] Allergy (Verified 06/01/17 13:30)


 


morphine Allergy (Verified 06/01/17 13:30)


 








Home Medications: 














 Medication  Instructions  Recorded


 


Cetirizine HCl [Zyrtec] 10 mg PO BID 01/09/17


 


Ranitidine HCl [Zantac] 150 mg PO TID 01/09/17


 


predniSONE 20 mg PO BID@07,18 01/17/17


 


EPINEPHRINE [EPIPEN] 0.3 mg IM PRN PRN 01/21/17


 


Ibuprofen [Motrin (*)] 600 mg PO TID 01/21/17


 


diphenhydrAMINE [Benadryl 0 mg IV CONT 03/06/17





Injection]  


 


predniSONE 30 mg PO DAILY@23 03/06/17


 


Bmx Susp (Benadryl,Maalox,Xylo) 5 ml PO TIDMEAL 03/07/17


 


Ergocalciferol [Vitamin D2 (*)] 50,000 i.unit PO FR #6 cap 03/17/17


 


Pantoprazole Sodium [Protonix 40mg 40 mg PO DAILY #30 tab 03/17/17





(*)]  


 


Potassium Chloride Po [Potassium 20 meq PO DAILY #30 udcup 03/17/17





Chloride 20 mg/15 ml (*)]  


 


diphenhydrAMINE [Benadryl 200 mg IV CONT  inj 03/17/17





Injection]  


 


Famotidine 20 mg/NaCl [Pepcid 20 20 mg IV 06/01/17





mg (Premix)]  


 


EPINEPHRINE [EPIPEN] 0.3 mg IM ONCE #2 syr 06/07/17














Medical Decision Making


ED Course/Re-evaluation: 





4:39 p.m.:  Care and management in consultation with primary  supervising 

physician Dr Loaiza .  I am familiar with this patient from prior emergency 

department visits.  I reviewed her medical history.  She has been given to pre-

hospital doses of EpiPen and has a continuous H1 H2 blocker infusion.  She is 

on daily prednisone already.  She is currently asymptomatic.  Will plan on 

observation for period of time and re-evaluation. 





6:01 p.m.:  Patient re-evaluated, lungs are clear, breathing comfortably, 

oropharynx is clear.  I the patient can be discharged from the emergency 

department.  I will give her refill of her EpiPen.  Recommend she follow up 

with Dr. Dinorah José.  Usual customary allergic reaction precautions 

instructions provided.  She lives close by in [place] and feels comfortable 

being discharged feels comfortable calling 911 should she develop return of 

symptoms.





Departure





- Departure


Disposition: Home, Routine, Self-Care


Clinical Impression: 


Allergic reaction


Qualifiers:


 Encounter type: initial encounter Qualified Code(s): T78.40XA - Allergy, 

unspecified, initial encounter





Condition: Good


Instructions:  General Allergic Reaction (ED)


Additional Instructions: 


If you have return of allergic reaction symptoms, use your EpiPen and call 911.


Referrals: 


Dinorah [place] MD [place] [place] - 1-2 days without fail


Prescriptions: 


EPINEPHRINE [EPIPEN] 0.3 mg IM ONCE #2 syr

## 2017-06-26 NOTE — GHP
[f rep st]



                                                            HISTORY AND PHYSICAL





DATE OF ADMISSION:  06/26/2017



CHIEF COMPLAINT:  Anemia.



HISTORY OF PRESENT ILLNESS:  A 37-year-old female with a history of mast cell activation syndrome, w
howard presents from the oncology clinic for blood transfusion.  The patient reports progressive and sev
ere fatigue over the course of the past several weeks with intermittent anaphylactic reactions to se
veral medications and foods causing neurologic symptoms.  This, however, has been a stable pattern f
or over the course of the past several months.  The patient reports blurring intermittent vision whi
ch again is stable for recent experiences.  Denies any headache.  Denies any chest pain or shortness
 of breath.  Does report episodes of diffuse neuropathic burning, stabbing pain that she treats with
 Ativan.  Denies any diarrhea or constipation.  Denies dysuria or hematuria.  Does have intermittent
 abdominal and facial swelling I think related to her steroids and fluid accumulation.



PAST MEDICAL HISTORY:  

1.  Mast cell activation syndrome.

2.  History of duodenitis.

3.  Severe protein-calorie malnutrition.

4.  Vitamin D deficiency.

5.  POT syndrome.

6.  Generalized weakness, chronic.



SOCIAL HISTORY:  Negative for tobacco, alcohol, or illicit drugs.



FAMILY HISTORY:  Negative for mast cell activation.



ADVANCED DIRECTIVES:  Patient is full cor, full tube.  Her sister is her MD POA.



REVIEW OF SYSTEMS:  A 10-point review of systems is negative with the exception of that reported in 
the HPI.



PHYSICAL EXAMINATION:  VITAL SIGNS:  Blood pressure is 112/72, heart rate 107, respiratory rate 16, 
99% on 2 L, 37.0. 

GENERAL:  This is a thin-appearing, pale female in no acute distress.  

HEENT:  Notable for dry mucous membranes.  Eye exam is negative for any icterus. 

CARDIAC:  Patient is tachycardic.  She has a quiet systolic murmur.  PULMONARY:  Clear to auscultati
on bilaterally. 

GASTROINTESTINAL:  Positive bowel sounds.  

ABDOMEN:  Soft and nontender in all 4 quadrants.  

MUSCULOSKELETAL:  Negative for any lower extremity edema. 

SKIN:  Pale.  No rashes are appreciated.  

NEUROLOGIC:  She is alert and oriented x3. 

PSYCHIATRIC:  She is pleasant and cooperative on interview and examination.



DATA:  White count 6.2.  Hemoglobin is 5.4.  Platelets are 269. Creatinine 0.5. 



EKG, which I personally reviewed and interpreted, shows sinus rhythm, leftward axis deviation, yessy
l intervals with no acute ST-T changes.



ASSESSMENT AND PLAN:  This is a 37-year-old female with severe anemia.

1.  Anemia secondary to acute blood loss.  The patient has undergone EGD in March of 2017 where she 
was found to have duodenitis thought to be potential source of blood loss.  The patient has been meghan
gavi on a proton pump inhibitor.  Hemoglobin has drifted down to 5.4 on the day of presentation.  We 
will transfuse very cautiously with a slow rate in hopes to avoid any anaphylactic reaction.  Must c
onsider possible re EGD when the patient has been stabilized.

2.  Mast cell activation syndrome.  The patient will be continued on her Benadryl drip with p.r.n. m
edications on her MAR including epinephrine, H2 blockers, high dose prednisone, and Zyrtec.  

3.  Chronic hypokalemia.  Patient will be continued on her daily supplementation and will follow BMP
s as needed.

4.  Neurologic sequelae of mast cell activation syndrome.  Patient has been controlled with her seiz
ure-like activity with lorazepam orally through the day.  Will continue this at her q.8 hour outpati
ent time interval.

5.  Duodenitis.  The patient will be continued on her b.i.d. pantoprazole as well as b.i.d. famotidi
ne.

6.  Vitamin D deficiency.  Will continue her oral daily supplementation. 

7.  Prophylaxis.  Will hold in the setting of severe anemia and suspected GI losses.

8.  Diet:  Regular for her.  Family will bring in her food.



DISPOSITION:  I expect greater than 2 midnights as patient is presenting with multiple complicated m
edical comorbidities requiring transfusion for severe anemia and monitoring for trial use of IV iron
 for stabilization of her anemia.  I have discussed the case with Hematology/Oncology.  The patient 
be admitted for transfusion and ongoing care.





Job #:  [id number]

## 2017-06-26 NOTE — GCON
[f rep st]



                                                                    CONSULTATION





HEMATOLOGY INITIAL VISIT





PRIMARY HEMATOLOGIST:  Dr. Amrita Archuleta



REASON FOR CONSULTATION:  Evaluation and management for mast cell activation syndrome.



HISTORY OF PRESENT ILLNESS:  The patient is a 37-year-old woman who was diagnosed with mast cell act
ivation syndrome I believe January of this year.  She is scheduled to see, or trying to get in to se epps, a specialist through the [facility].  Symptoms include postural hypotension, POTS, h
ad been confirmed at [facility].  She has had flushing, abdominal pain, cramping
, burning and stinging, and previously had weight loss.  After the hospitalization in January, she w
as discharged home on a Benadryl drip and she is on steroids which have ranged between 60 and 80 mg 
daily.  She has had an upper endoscopy, which biopsy of the duodenum showed mast cells with  im
munostain and 25 mast cells per high-power field.  I believe a bone marrow biopsy did not show signi
ficant mast cells. 



More recently, she has been progressively more anemic and appears to be iron deficient.  Dr. Archuleta h
as been a little reluctant to give her IV iron in the office because of her history.  Today, she was
 in followup and her hemoglobin was down to 5.4.  It was 6.5, 2 to 3 weeks ago and I believe she had
 a couple units of blood around that time.  She denies any blood in her stool or any overt bleeding.
  She does have fatigue and reports some minor chest discomfort.  She has been [facility] for management of her severe anemia.



ALLERGIES:  She has multiple allergies listed, please see the chart.



HOME MEDICATIONS:  Prednisone 60 mg daily.  Diphenhydramine IV.  Ranitidine.  Potassium.  Pantoprazo
le.  Ibuprofen.



PAST MEDICAL HISTORY:  Chronic illnesses include:

1.  The mast cell activation syndrome, as per HPI.

2.  POTS.



SOCIAL HISTORY:  Denies tobacco, alcohol, or illicit drug use.



FAMILY HISTORY:  Noncontributory.



REVIEW OF SYSTEMS:  A 10-point review of systems performed, pertinent positives per HPI, otherwise n
egative.



PHYSICAL EXAM:  VITALS:  Temperature is 36.8, pulse is 111, blood pressure is 120/78.  GENERAL:  She
 is a pale-appearing woman.  No distress, but she does have some cushingoid features.  HEENT:  Oral 
mucosa is unremarkable.  LUNGS:  Clear.  CARDIAC:  Tachycardic, but regular with a soft systolic mur
mur.  ABDOMEN:  Soft, nontender.  EXTREMITIES:  No pitting edema.



LABS:  Her white count is normal.  Platelet count is normal.  Hemoglobin is 5.4 g/dL.  Last iron sat
uration was 5% and ferritin was 5.  B12 was normal.  Chemistry panel was unremarkable.



IMPRESSION:  

1.  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

2.  Severe iron deficiency anemia. 



I discussed her case with both Dr. Archuleta, as well as the hospitalist, and the plan is to transfuse h
er today, slowly, as she often has infusion reactions.  If she tolerates that well, then we can jannette
t her with IV iron in the hospital, watching her closely.  If she tolerates that, then it could be c
onverted to outpatient.  While in the hospital, we can have Dr. Aiken with which GI evaluate and se epps if he would like to re-scope her to make sure there is not obvious source of bleeding.  We will fo
llow along with you, while in the hospital.





Job #:  [id number]

## 2017-06-27 NOTE — HOSPPROG
Hospitalist Progress Note


Assessment/Plan: 





#  severe anemia secondary to blood loss-  patient presented with a hemoglobin 

of 5-  duodenitis visualized on EGD previously as suspected source blood loss


    successfully transfused 2 units packed red blood cells overnight-  

hemoglobin 9 this morning


   -  recheck H&H in a.m.


   -  if patient drops overnight would consider transfusing again prior to 

disposition





#  iron deficiency-  secondary to blood loss-  oxygen saturations 95% on room 

air


   -  trialing IV iron infusion today


   -  pre treating with p.o. Benadryl in addition to IV Benadryl drip


   -  infusing at slow rate





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


   -  continue IV Benadryl drip


   -  continue prednisone


   -  continue PPI and H2 blocker


   -  continue Zyrtec


   -  continue ketotifen





#  sinus tachycardia-   suspect volume related is heart rates came down 

overnight after transfusion


   EKG( personally reviewed and interpreted) sinus rhythm


   -  continue maintenance IVF


   -  consider transfusing again in a.m. if H&H dropped





#  prophylaxis- holding Lovenox in the setting of slow GI losses and severe 

anemia


#  diet regular


#  disposition greater than 2 midnights as the patient requires close 

monitoring with administration of transfusions and new medication





 I have discussed the case with Dr. Anderson we will attempt IV iron today and 

monitor H&H overnight


Subjective: more energy today after transfusion


Objective: 


 Vital Signs











Temp Pulse Resp BP Pulse Ox


 


 36.6 C   98   18   128/100 H  96 


 


 06/27/17 08:32  06/27/17 11:05  06/27/17 11:05  06/27/17 11:05  06/27/17 11:05








 Laboratory Results





 06/27/17 05:50 





 











 06/26/17 06/27/17 06/28/17





 05:59 05:59 05:59


 


Intake Total  1120 


 


Balance  1120 














- Physical Exam


Constitutional: chronically ill appearing


Eyes: anicteric sclera


Ears, Nose, Mouth, Throat: moist mucous membranes


Cardiovascular: regular rate and rhythym, systolic murmur, No tachycardia


Respiratory: no respiratory distress, no rales or rhonchi


Gastrointestinal: normoactive bowel sounds


Genitourinary: no bladder fullness


Skin: warm, normal color


Musculoskeletal: No asymmetric calves


Neurologic: AAOx3


Psychiatric: interacting appropriately, not anxious


Lymph, Heme, Immunologic: no cervical LAD





ICD10 Worksheet


Patient Problems: 


 Problems











Problem Status Onset


 


Allergic reaction Acute  


 


Anaphylaxis Acute  


 


Anemia Acute  


 


Anorexia Acute  


 


Cachexia Acute  


 


Electrolyte abnormality Acute  


 


Hypokalemia Acute  


 


Mast cell disease Acute

## 2017-06-27 NOTE — SOAPPROG
SOAP Progress Note


Assessment/Plan: 


E&M for Iron deficiency Anemia





* Iron deficiency Anemia: will give iron iv today and see how she does. If she 

tolerates it, can be changed to outpatient.





* Presumed Mast Cell Activation Syndrome: continue iv benadryl and steroids.





Subjective: 





No significant reactions to blood transfusion. Feeling better.


Objective: 





 Vital Signs











Temp Pulse Resp BP Pulse Ox


 


 36.6 C   103 H  16   118/80   95 


 


 06/27/17 08:32  06/27/17 08:32  06/27/17 08:32  06/27/17 08:32  06/27/17 08:32








 Laboratory Results





 06/27/17 05:50 





 











 06/26/17 06/27/17 06/28/17





 05:59 05:59 05:59


 


Intake Total  1120 


 


Balance  1120 














Physical Exam





- Physical Exam


General Appearance: no apparent distress


Respiratory: lungs clear


Cardiac/Chest: regular rate, rhythm





ICD10 Worksheet


Patient Problems: 


 Problems











Problem Status Onset


 


Allergic reaction Acute  


 


Anaphylaxis Acute  


 


Anemia Acute  


 


Anorexia Acute  


 


Cachexia Acute  


 


Electrolyte abnormality Acute  


 


Hypokalemia Acute  


 


Mast cell disease Acute

## 2017-06-29 NOTE — GDS
[f rep st]



                                                             DISCHARGE SUMMARY





DISCHARGE DIAGNOSES:  

1.  Acute severe anemia presumed secondary to blood loss.  Hemoglobin of 5.

2.  Mast cell activation syndrome, on IV Benadryl pump. 

3.  Iron-deficiency anemia secondary to chronic blood loss.

4.  Duodenitis.



HISTORY OF PRESENT ILLNESS:  A 37-year-old female with a history of mast cell activation syndrome, c
losely managed by outpatient Allergy/Immunology, who presented to her oncologist and found have a he
moglobin of 5.  Transitioned to the hospital for red blood cell transfusion.  For details of patient
's initial presentation, please see the history and physical dated 06/26/2017.



CONSULTATIVE SERVICES:  Oncology.



PROCEDURES:  Blood transfusion.



HOSPITAL COURSE BY ISSUE:  

1.  Severe anemia.  Patient had a known diagnosis of duodenitis prior to this with presumed chronic 
GI losses, was found to have a hemoglobin of 5.  Poorly-tolerated outpatient blood transfusion secon
favian to her mast cell activation syndrome, therefore, brought into the hospital for transfusion and 
close monitoring.  Patient received 2 units of packed red blood cells with an improvement in her hem
oglobin to 9.7.  Patient was monitored overnight and was maintaining her hemoglobin at a level of 9 
on the day of disposition.  She will continue to be followed in the outpatient setting.

2.  Iron deficiency.  We closely monitored the patient as we infused IV iron.  Patient tolerated thi
s infusion at a very slow rate through a peripheral IV that was placed with topical Benadryl, as wel
l as IV pushes on top of her IV Benadryl drip.  Patient did tolerate this IV infusion, which can be 
reattempted in the outpatient setting under similar circumstances.

3.  Mast cell activation syndrome.  We did not change any of this patient's outpatient medications. 
 She is being discharged on IV Benadryl drip, on H2 blocker, proton pump inhibitor, Zyrtec, and keto
tifen, as well as high-dose prednisone.



MEDICATIONS AT THE TIME OF DISPOSITION:  Please reference med rec printed on 06/28/2017.



FOLLOWUP APPOINTMENTS:  Include with her rheumatologist, Dr. José, as well as Dr. Archuleta. 



I spent greater than 30 minutes in the planning and coordination of this discharge.





Job #:  [id number]

## 2017-07-08 NOTE — CPEKG
Heart Rate: 93

RR Interval: 645

P-R Interval: 128

QRSD Interval: 86

QT Interval: 324

QTC Interval: 403

P Axis: 39

QRS Axis: -31

T Wave Axis: 55

EKG Severity - OTHERWISE NORMAL ECG -

EKG Impression: SINUS RHYTHM

EKG Impression: LEFT AXIS DEVIATION

Electronically Signed By: Supa Camara 08-Jul-2017 21:08:28

## 2017-07-08 NOTE — EDPHY
H & P


Stated Complaint: Seizures x 2 referred by her Neurologist


Time Seen by Provider: 07/08/17 20:35


HPI/ROS: 





CHIEF COMPLAINT:  Seizures





HISTORY OF PRESENT ILLNESS:  The patient is a is a 37-year-old female with a 

complicated medical history including mast cell activation syndrome on chronic 

Benadryl and ranitidine infusions and chronic steroids.  Also with the seizure 

disorder that is treated with the ranitidine infusion and with 0.5 of Ativan 

every 8 hours.  She also has chronic hypotension.  Her neurologist Dr. Rasmussen and 

her immunologist is Dr. Dinorah José.  She has been trying to wean off her 

prednisone and wonders if that is why she has had increased seizures lately.  

Today she had 2 episodes which is unusual for her.  She is now asymptomatic.  

She denies head injury. She denies fevers or recent illness.  She has been on 

the Ativan for about a month and thinks that she may be becoming tolerant to it 

as well.  She does not have any focal deficits.  The her seizures lasted for 

less than a minute and only involve her upper extremities.  She does not 

typically lose consciousness.








REVIEW OF SYSTEMS:


Constitutional:  denies: chills, fever, recent illness, recent injury


EENTM: denies: blurred vision, double vision, nose congestion


Respiratory: denies: cough, shortness of breath


Cardiac: denies: chest pain, irregular heart rate, lightheadedness, palpitations


Gastrointestinal/Abdominal: denies: abdominal pain, diarrhea, nausea, vomiting, 

blood streaked stools


Genitourinary: denies: dysuria, frequency, hematuria, pain


Musculoskeletal: denies: joint pain, muscle pain


Skin: denies: lesions, rash, jaundice, bruising


Neurological:  See HPIdenies: headache, numbness, paresthesia, tingling, 

dizziness, weakness


Hematologic/Lymphatic: denies: blood clots, easy bleeding, easy bruising


Immunologic/allergic: denies: HIV/AIDS, transplant








EXAM:


GENERAL:  Pale, poor muscle tone


HEAD:  Atraumatic, normocephalic.


EYES:  Pupils equal round and reactive to light, extraocular movements intact, 

sclera anicteric, conjunctiva are normal.


ENT:  TMs normal, nares patent, oropharynx clear without exudates.  Moist 

mucous membranes.


NECK:  Normal range of motion, supple without lymphadenopathy or JVD.


LUNGS:  Breath sounds clear to auscultation bilaterally and equal.  No wheezes 

rales or rhonchi.


HEART:  Regular rate and rhythm without murmurs, rubs or gallops.


ABDOMEN:  Soft, nontender, normoactive bowel sounds.  No guarding, no rebound.  

No masses appreciated.


BACK:  No CVA tenderness, no spinal tenderness, step-offs or deformities


EXTREMITIES:  Normal range of motion, no pitting or edema.  No clubbing or 

cyanosis.


NEUROLOGICAL:  Cranial nerves II through XII grossly intact.  Normal speech, 

normal gait.  5/5 strength, normal movement in all extremities, normal sensation


PSYCH:  Normal mood, normal affect.


SKIN:  Warm, dry, normal turgor, no visible rashes or lesions.








Source: Patient


Exam Limitations: No limitations





- Personal History


LMP (Females 10-55): Hysterectomy


Tetanus Vaccine Date: Contrainicated by MAST cell disorder





- Medical/Surgical History


Hx Asthma: No


Hx Chronic Respiratory Disease: No


Hx Diabetes: No


Hx Cardiac Disease: No


Hx Renal Disease: No


Hx Cirrhosis: No


Hx Alcoholism: No


Hx HIV/AIDS: No


Hx Splenectomy or Spleen Trauma: No


Other PMH: postural tachycardia syndrome, mast cell dysfunction, postural 

hypotension, allergic to dairy, soy, shellfish, bananas and citrus. Sensitivity 

to light and noise stimulation./duodenitis, seizures





- Family History


Significant Family History: No pertinent family hx





- Social History


Smoking Status: Never smoked


Alcohol Use: Sober


Drug Use: None


Constitutional: 


 Initial Vital Signs











Temperature (C)  36.8 C   07/08/17 20:13


 


Heart Rate  93   07/08/17 20:13


 


Respiratory Rate  20   07/08/17 20:13


 


Blood Pressure  124/78 H  07/08/17 20:13


 


O2 Sat (%)  91 L  07/08/17 20:13








 











O2 Delivery Mode               Room Air














Allergies/Adverse Reactions: 


 





metronidazole [From Flagyl] Allergy (Severe, Verified 06/01/17 13:30)


 Other-Enter Comments


acetaminophen [From Vicodin] Allergy (Verified 06/01/17 13:30)


 


aspirin Allergy (Verified 06/01/17 13:30)


 


cyanocobalamin (vitamin B12) Allergy (Verified 07/08/17 20:19)


 


hydrocodone bitartrate [From Vicodin] Allergy (Verified 06/01/17 13:30)


 


levetiracetam [From Keppra] Allergy (Verified 07/08/17 20:19)


 


methylprednisolone [From Solu-Medrol] Allergy (Verified 06/01/17 13:30)


 


morphine Allergy (Verified 06/01/17 13:30)


 








Home Medications: 














 Medication  Instructions  Recorded


 


Ranitidine HCl [Zantac] 150 mg PO TID 01/09/17


 


predniSONE 20 mg PO TID 01/17/17


 


EPINEPHRINE [EPIPEN] 0.3 mg IM PRN PRN 01/21/17


 


Ibuprofen [Motrin (*)] 200 - 400 mg PO Q2HRS PRN 01/21/17


 


diphenhydrAMINE [Benadryl 0 mg IV CONT 03/06/17





Injection]  


 


Potassium Chloride Po [Potassium 20 meq PO DAILY #30 udcup 03/17/17





Chloride 20 mg/15 ml (*)]  


 


Cetirizine [ZyrTEC 10 mg (*)] 10 mg PO BID 06/26/17


 


Cholecalciferol Vit D3 [Vitamin D3 1,000 units PO DAILY 06/26/17





(*)]  


 


Ketotifen 1mg 1 mg PO BID 06/26/17


 


LORazepam [Ativan (*)] 0.5 mg PO Q8HRS 06/26/17


 


Pantoprazole Sodium [Protonix 40mg 40 mg PO BID 06/26/17





(*)]  


 


Sodium Chloride 0.9% 1000 Ml Bag 0 ml IV CONT 06/26/17


 


LORazepam [Ativan 1 mg (RX)] 1 mg PO Q8 #30 tab 07/08/17














Medical Decision Making





- Diagnostics


EKG Interpretation: 





An EKG obtained and was read and documented in trace view.  Please see trace 

view for full reading and report.  Sinus rhythm, no acute ischemic changes 


ED Course/Re-evaluation: 





The patient is at her baseline.  We discussed options.  I will increase her 

Ativan to 1 mg every 8 hours.  She and her sister agree with this.  Her sister 

stated that she also had some mild chest tightness earlier today.  I will 

obtain an EKG.


Differential Diagnosis: 





Partial list of the Differential diagnosis considered include but were not 

limited to;  seizures, allergic reaction, mast cell activation syndrome, 

medication withdrawal and although unlikely based on the history and physical 

exam, I also considered infection, head injury.  I discussed these differential 

diagnoses and the plan with the patient as well as the usual and expected 

course.  The patient understands that the diagnosis is provisional and that in 

medicine we are not always correct and that further workup is often warranted.  

Usual and customary warnings were given.  All of the patient's questions were 

answered.  The patient was instructed to return to the emergency department 

should the symptoms at all worsen or return, otherwise to followup with the 

physician as we discussed.





- Data Points


Medications Given: 


 








Discontinued Medications





Lorazepam (Ativan)  0.5 mg PO EDNOW ONE


   Stop: 07/08/17 20:55


   Last Admin: 07/08/17 21:03 Dose:  0.5 mg








Departure





- Departure


Disposition: Home, Routine, Self-Care


Clinical Impression: 


 Seizure disorder





Condition: Fair


Instructions:  Recurrent Seizures in Adults (ED)


Referrals: 


NONE *PRIMARY CARE P,. [Primary Care Provider] - As per Instructions


Dinorah José MD [[facility] [place] Primary [facility] Provider] - As per Instructions


Perri Gomez DO [Non Staff and Non MD] - As per Instructions


Prescriptions: 


LORazepam [Ativan 1 mg (RX)] 1 mg PO Q8 #30 tab

## 2017-07-19 NOTE — EDPHY
H & P


Time Seen by Provider: 07/19/17 08:20


HPI/ROS: 





Chief complaint.  Seizure





HPI.  37-year-old female here by EMS after having apparent seizure.  The 

patient has mast cell activation syndrome and then has a seizure disorder that 

has developed in the past 2-3 months thought to be secondary to mass cell D 

granulation in her brain.  She has an MRI scheduled but it has been difficult 

to get as the patient controlled her seizures with a he ranitidine infusion and 

apparently the pump is not compatible with MRI.  Yesterday the patient had some 

facial and mouth swelling as well as flushing and shaking.  She says she has 

several small seizures yesterday.  This morning she had spasm and some speaking 

gibberish shin twisting to 1 side.  No loss of consciousness.  She is 

transitioning medication from lorazepam to clonazepam.  Today was her 1st full 

dose of clonazepam and no lorazepam.  Her sister did give her lorazepam at 

about 630 this morning.  No fever, chest pain, headache, abdominal pain





ROS


Constitutional.  no fever/chills, no weakness


Eyes.  no problems with vision


ENT.  no sore throat, no nasal drainage


Cardiovascular.  no chest pain


Respiratory.  no shortness of breath, no cough


Abdominal.  no abdominal pain, no nausea/vomiting, no diarrhea


.  no problems urinating


MS.  no calf pain/swelling, no neck/back pain, no joint pain


Skin.  no rash


Lymph.  no swollen glands


Neuro.  Possible seizure


Past Medical/Surgical History: 





Postural tachycardia syndrome, mast cell dysfunction on chronic Benadryl and 

ranitidine infusion.  Seizure disorder


Social History: 





Single, nonsmoker, no alcohol


Smoking Status: Never smoked


Physical Exam: 





General Appearance:  Alert well-developed female mild distress vital signs are 

stable


Eyes: Pupils equal and round no pallor or injection.


ENT, Mouth:  Mucous membranes are moist. No tongue biting.  No pharyngeal 

swelling.  No stridor


Respiratory:  There are no retractions, lungs are clear to auscultation.


Cardiovascular: Regular rate and rhythm.


Gastrointestinal:   Abdomen is soft and nontender, no masses, bowel sounds 

normal.


Neurological:  Awake and alert, sensory and motor exams grossly normal. Speech 

is normal. Cranial nerves are normal.  Finger-to-nose heel-to-shin are normal. 

No pronator drift.


Skin: Warm and dry, no rashes.


Musculoskeletal:  Neck is supple nontender.


Extremities  symmetrical, full range of motion.


Psychiatric: Patient is oriented X 3, there is no agitation.


Constitutional: 


 Initial Vital Signs











Temperature (C)  37.4 C   07/19/17 07:55


 


Heart Rate  102 H  07/19/17 07:55


 


Respiratory Rate  16   07/19/17 07:55


 


Blood Pressure  138/78 H  07/19/17 07:55


 


O2 Sat (%)  94   07/19/17 07:55








 











O2 Delivery Mode               Room Air














Allergies/Adverse Reactions: 


 





metronidazole [From Flagyl] Allergy (Severe, Verified 06/01/17 13:30)


 Other-Enter Comments


acetaminophen [From Vicodin] Allergy (Verified 06/01/17 13:30)


 


aspirin Allergy (Verified 06/01/17 13:30)


 


cyanocobalamin (vitamin B12) Allergy (Verified 07/08/17 20:19)


 


hydrocodone bitartrate [From Vicodin] Allergy (Verified 06/01/17 13:30)


 


levetiracetam [From Keppra] Allergy (Verified 07/08/17 20:19)


 


methylprednisolone [From Solu-Medrol] Allergy (Verified 06/01/17 13:30)


 


morphine Allergy (Verified 06/01/17 13:30)


 








Home Medications: 














 Medication  Instructions  Recorded


 


Ranitidine HCl [Zantac] 150 mg PO TID 01/09/17


 


predniSONE 20 mg PO TID 01/17/17


 


EPINEPHRINE [EPIPEN] 0.3 mg IM PRN PRN 01/21/17


 


Ibuprofen [Motrin (*)] 200 - 400 mg PO Q2HRS PRN 01/21/17


 


diphenhydrAMINE [Benadryl 0 mg IV CONT 03/06/17





Injection]  


 


Potassium Chloride Po [Potassium 20 meq PO DAILY #30 udcup 03/17/17





Chloride 20 mg/15 ml (*)]  


 


Cetirizine [ZyrTEC 10 mg (*)] 10 mg PO BID 06/26/17


 


Cholecalciferol Vit D3 [Vitamin D3 1,000 units PO DAILY 06/26/17





(*)]  


 


Ketotifen 1mg 1 mg PO BID 06/26/17


 


LORazepam [Ativan (*)] 0.5 mg PO Q8HRS 06/26/17


 


Pantoprazole Sodium [Protonix 40mg 40 mg PO BID 06/26/17





(*)]  


 


Sodium Chloride 0.9% 1000 Ml Bag 0 ml IV CONT 06/26/17


 


LORazepam [Ativan 1 mg (RX)] 1 mg PO Q8 #30 tab 07/08/17














Medical Decision Making





- Diagnostics


Imaging Results: 


 Imaging Impressions





Head CT  07/19/17 08:45


Impression: There is no acute abnormality identified on this unenhanced CT 

evaluation.


 


Note: The patient reportedly is not a candidate for MR imaging secondary to the 

presence of an MR-incompatible pump.


 


Findings were discussed with FERDINAND ROBLES MD at 10:41 am, on 7/19/2017.


 








Noncontrast head CT is normal


Procedures: 





IV normal saline, monitor, seizure precautions


ED Course/Re-evaluation: 





Re-evaluation 11:35 a.m. patient is stable.  She is offered admission but she 

and her sister feel comfortable with discharge. We have talked about imaging 

and laboratory evaluation.  We discussed treatment plan including criteria for 

return importance of follow-up and further evaluation.  They expressed 

understanding and agreement





I consulted and discussed the case with physician covering for Dr. José.  

She expresses agreement with management.


Differential Diagnosis: 





Is unclear if these really represents seizures.  She does not lose 

consciousness.  They are more like spasms and some difficulty speaking.  I 

considered intracranial bleeding as well as brain tumor.  Her workup today is 

normal. Her blood level is stable. There are no acute findings.





- Data Points


Laboratory Results: 


 Laboratory Results





 07/19/17 09:39 





 07/19/17 09:14 





 











  07/19/17 07/19/17 07/19/17





  09:39 09:14 09:14


 


WBC  8.12 10^3/uL 10^3/uL    





   (3.80-9.50)   


 


RBC  3.70 10^6/uL L 10^6/uL    





   (4.18-5.33)   


 


Hgb  10.3 g/dL L g/dL    





   (12.6-16.3)   


 


Hct  32.7 % L %    





   (38.0-47.0)   


 


MCV  88.4 fL fL    





   (81.5-99.8)   


 


MCH  27.8 pg L pg    





   (27.9-34.1)   


 


MCHC  31.5 g/dL L g/dL    





   (32.4-36.7)   


 


RDW  19.4 % H %    





   (11.5-15.2)   


 


Plt Count  236 10^3/uL 10^3/uL    





   (150-400)   


 


MPV  8.4 fL L fL    





   (8.7-11.7)   


 


Neut % (Auto)  Not Reported     





    


 


Lymph % (Auto)  Not Reported     





    


 


Mono % (Auto)  Not Reported     





    


 


Eos % (Auto)  Not Reported     





    


 


Baso % (Auto)  Not Reported     





    


 


Nucleat RBC Rel Count  0.5 % H %    





   (0.0-0.2)   


 


Absolute Neuts (auto)  Not Reported     





    


 


Absolute Lymphs (auto)  Not Reported     





    


 


Absolute Monos (auto)  Not Reported     





    


 


Absolute Eos (auto)  Not Reported     





    


 


Absolute Basos (auto)  Not Reported     





    


 


Absolute Nucleated RBC  0.04 10^3/uL H 10^3/uL    





   (0-0.01)   


 


Immature Gran %  Not Reported     





    


 


Seg Neutrophils %  67 % %    





    


 


Band Neutrophils %  5 % %    





    


 


Lymphocytes %  16 % %    





    


 


Monocytes %  9 % %    





    


 


Myelocytes %  3 % %    





    


 


Immature Gran #  Not Reported     





    


 


Absolute Seg Neuts  5.44 10^/uL 10^/uL    





   (1.70-6.50)   


 


Absolute Band Neuts  0.41 10^3/uL 10^3/uL    





   (0.00-0.70)   


 


Absolute Lymphocytes  1.30 10^3/uL 10^3/uL    





   (1.00-3.00)   


 


Absolute Monocytes  0.73 10^3/uL 10^3/uL    





   (0.30-0.80)   


 


Absolute Myelocytes  0.24 10^3/mL H 10^3/mL    





   (0.00-0.00)   


 


Platelet Estimate  ADEQUATE     





   (ADEQ)   


 


Hypochromasia  1+  H     





    


 


Microcytic Cells  1+  H     





    


 


Stomatocytes  1+  H     





    


 


Smear Review By  Pending     





    


 


Turbidity      206 





    


 


Sodium      142 mEq/L mEq/L





     (134-144) 


 


Potassium      4.7 mEq/L mEq/L





     (3.5-5.2) 


 


Chloride      108 mEq/L mEq/L





     () 


 


Carbon Dioxide      21 mEq/l L mEq/l





     (22-31) 


 


Anion Gap      13 mEq/L mEq/L





     (8-16) 


 


BUN      19 mg/dL mg/dL





     (7-23) 


 


Creatinine      0.6 mg/dL mg/dL





     (0.6-1.0) 


 


Estimated GFR      > 60 





    


 


Glucose      116 mg/dL H mg/dL





     () 


 


Calcium      9.9 mg/dL mg/dL





     (8.5-10.4) 


 


Troponin I      < 0.012 ng/mL ng/mL





     (0-0.034) 


 


Beta HCG, Qual    NEGATIVE   





    


 


Specimen Hemolysis      NP 





    














Departure





- Departure


Disposition: Home, Routine, Self-Care


Clinical Impression: 


 Mast cell disease





Condition: Good


Instructions:  New-Onset Seizure in Adults (ED)


Additional Instructions: 


Continue regular medications.  Return for worsening seizures, fever.  Follow up 

with Dr. José and Patricia for further evaluation.


Referrals: 


Patient,NotPresent [Unknown] - As per Instructions


Dinorah José MD [[facility] [place] Primary Care Provider] - As per Instructions


Perri Gomez DO [Non Staff and Non MD] - As per Instructions

## 2017-07-19 NOTE — CPEKG
Heart Rate: 100

RR Interval: 600

P-R Interval: 124

QRSD Interval: 78

QT Interval: 308

QTC Interval: 398

P Axis: 32

QRS Axis: -35

T Wave Axis: 66

EKG Severity - OTHERWISE NORMAL ECG -

EKG Impression: SINUS TACHYCARDIA

EKG Impression: LEFT AXIS DEVIATION

Electronically Signed By: Juan Luis Genao 19-Jul-2017 15:11:02

## 2017-08-03 NOTE — EDPHY
H & P


Stated Complaint: AMS, SWELLING TO FACE AND JAW


HPI/ROS: 





HPI


The patient presents brought in by ambulance for an episode of ALOC which 

occurred at about 9:00 p.m. tonight when she was going from her bed to the 

bedside commode.  She began to feel woozy, was going in and out of consciousness

, family members had to lift her from the commode into bed.  She has felt 

nauseated and generally out of it ever since.  She is feeling mild shortness of 

breath. She has a history of mast cell syndrome and she has a Benadryl pump in 

place.  She did use her EpiPen today because of jaw swelling and shortness of 

breath..





REVIEW OF SYSTEMS


Constitutional:  No fever, no chills.


Eyes:  No discharge.


ENT:  No sore throat.


Cardiovascular:  No chest pain, no palpitations.


Respiratory:  No cough, no shortness of breath.


Gastrointestinal:  No abdominal pain, no vomiting.


Genitourinary:  No hematuria.


Musculoskeletal:  No back pain.


Skin:  No rashes.


Neurological:  No headache.





PMHx:  Mass cell syndrome, pots disease, history of anemia with duodenitis





Soc Hx:  Lives at home, house has mold right now so she is living with family 

in Hygiene








PHYSICAL


General Appearance: Alert, no distress


Head:  Alopecia, jaw is generally edematous


Eyes: Pupils equal and round no pallor or injection


ENT, Mouth: Mucous membranes moist


Respiratory: There are no retractions, lungs are clear to auscultation


Cardiovascular:  Regular rate and rhythm 


Gastrointestinal:  Abdomen is soft and non-tender, no masses, bowel sounds 

normal 


Neurological:  A&O, moves all extremities


Skin:  Warm and dry, no rashes


Musculoskeletal: Neck is supple non tender 


Extremities:  symmetrical, full range of motion 


Psychiatric:  Patient is oriented X 3, there is no agitation 





Source: Patient, EMS


Exam Limitations: No limitations





- Personal History


LMP (Females 10-55): Irregular


Tetanus Vaccine Date: Contrainicated by MAST cell disorder





- Medical/Surgical History


Hx Asthma: No


Hx Chronic Respiratory Disease: No


Hx Diabetes: No


Hx Cardiac Disease: No


Hx Renal Disease: No


Hx Cirrhosis: No


Hx Alcoholism: No


Hx HIV/AIDS: No


Hx Splenectomy or Spleen Trauma: No


Other PMH: postural tachycardia syndrome, mast cell dysfunction, postural 

hypotension, allergic to dairy, soy, shellfish, bananas and citrus. Sensitivity 

to light and noise stimulation./duodenitis, seizures





- Social History


Smoking Status: Never smoked


Constitutional: 


 Initial Vital Signs











Temperature (C)  36.9 C   08/03/17 22:28


 


Heart Rate  92   08/03/17 22:28


 


Respiratory Rate  18   08/03/17 22:28


 


Blood Pressure  121/85 H  08/03/17 22:28


 


O2 Sat (%)  97   08/03/17 22:28








 











O2 Delivery Mode               Room Air


 


O2 (L/minute)                  1














Allergies/Adverse Reactions: 


 





metronidazole [From Flagyl] Allergy (Severe, Verified 08/03/17 22:39)


 Other-Enter Comments


acetaminophen [From Vicodin] Allergy (Verified 08/03/17 22:39)


 


aspirin Allergy (Verified 08/03/17 22:39)


 


cyanocobalamin (vitamin B12) Allergy (Verified 08/03/17 22:39)


 


hydrocodone bitartrate [From Vicodin] Allergy (Verified 08/03/17 22:39)


 


levetiracetam [From Keppra] Allergy (Verified 08/03/17 22:39)


 


methylprednisolone [From Solu-Medrol] Allergy (Verified 08/03/17 22:39)


 


morphine Allergy (Verified 08/03/17 22:39)


 








Home Medications: 














 Medication  Instructions  Recorded


 


Ranitidine HCl [Zantac] 150 mg PO TID 01/09/17


 


predniSONE 20 mg PO TID 01/17/17


 


EPINEPHRINE [EPIPEN] 0.3 mg IM PRN PRN 01/21/17


 


Ibuprofen [Motrin (*)] 200 - 400 mg PO Q2HRS PRN 01/21/17


 


diphenhydrAMINE [Benadryl 0 mg IV CONT 03/06/17





Injection]  


 


Potassium Chloride Po [Potassium 20 meq PO DAILY #30 udcup 03/17/17





Chloride 20 mg/15 ml (*)]  


 


Cetirizine [ZyrTEC 10 mg (*)] 10 mg PO BID 06/26/17


 


Cholecalciferol Vit D3 [Vitamin D3 1,000 units PO DAILY 06/26/17





(*)]  


 


Ketotifen 1mg 1 mg PO BID 06/26/17


 


Pantoprazole Sodium [Protonix 40mg 40 mg PO BID 06/26/17





(*)]  


 


Sodium Chloride 0.9% 1000 Ml Bag 0 ml IV CONT 06/26/17














Medical Decision Making





- Diagnostics


EKG Interpretation: 





EKG:  Complete interpretation has been separately recorded in the [facility] 

archive.  Summary impression:  Normal sinus rhythm





Differential Diagnosis: 





This is a 37-year-old female with a history of mast cell activation syndrome, 

on chronic steroids, Benadryl pump, used EpiPen today who presents with an 

episode of ALOC while using her bedside commode which sounds to be syncopal 

versus presyncopal.  Could be related to hypotension given her history of pots 

and mast cell disorder.  Could be a arrhythmia, and anemia, electrolyte 

disturbance.





Plan for cardiac monitor, IV fluids, basic labs, EKG.





In the ER, labs and studies were unremarkable including EKG.  The patient had 

no events on the cardiac monitor and was not hypotensive.  I feel the cause of 

her symptoms was likely a vasovagal event that may have been exacerbated by an 

episode of her mast cell activation syndrome.  She will be discharged home and 

is feeling well enough to leave the ER with her sister.





- Data Points


Laboratory Results: 


 Laboratory Results





 08/03/17 23:10 





 08/03/17 23:10 





 











  08/03/17 08/03/17





  23:10 23:10


 


WBC    8.39 10^3/uL 10^3/uL





    (3.80-9.50) 


 


RBC    3.57 10^6/uL L 10^6/uL





    (4.18-5.33) 


 


Hgb    9.9 g/dL L g/dL





    (12.6-16.3) 


 


Hct    30.5 % L %





    (38.0-47.0) 


 


MCV    85.4 fL fL





    (81.5-99.8) 


 


MCH    27.7 pg L pg





    (27.9-34.1) 


 


MCHC    32.5 g/dL g/dL





    (32.4-36.7) 


 


RDW    18.6 % H %





    (11.5-15.2) 


 


Plt Count    228 10^3/uL 10^3/uL





    (150-400) 


 


MPV    8.6 fL L fL





    (8.7-11.7) 


 


Neut % (Auto)    Not Reported 





   


 


Lymph % (Auto)    Not Reported 





   


 


Mono % (Auto)    Not Reported 





   


 


Eos % (Auto)    Not Reported 





   


 


Baso % (Auto)    Not Reported 





   


 


Nucleat RBC Rel Count    0.2 % %





    (0.0-0.2) 


 


Absolute Neuts (auto)    Not Reported 





   


 


Absolute Lymphs (auto)    Not Reported 





   


 


Absolute Monos (auto)    Not Reported 





   


 


Absolute Eos (auto)    Not Reported 





   


 


Absolute Basos (auto)    Not Reported 





   


 


Absolute Nucleated RBC    0.02 10^3/uL H 10^3/uL





    (0-0.01) 


 


Immature Gran %    Not Reported 





   


 


Seg Neutrophils %    63 % %





   


 


Band Neutrophils %    12 % %





   


 


Lymphocytes %    20 % %





   


 


Monocytes %    3 % %





   


 


Metamyelocytes %    2 % %





   


 


Immature Gran #    Not Reported 





   


 


Absolute Seg Neuts    5.29 10^/uL 10^/uL





    (1.70-6.50) 


 


Absolute Band Neuts    1.01 10^3/uL H 10^3/uL





    (0.00-0.70) 


 


Absolute Lymphocytes    1.68 10^3/uL 10^3/uL





    (1.00-3.00) 


 


Absolute Monocytes    0.25 10^3/uL L 10^3/uL





    (0.30-0.80) 


 


Absolute Metamyelocyte    0.17 10^3/mL H 10^3/mL





    (0.00-0.00) 


 


Toxic Granulation    PRESENT  H 





   


 


Platelet Estimate    ADEQUATE 





    (ADEQ) 


 


Polychromasia    1+  H 





   


 


Sodium  138 mEq/L mEq/L  





   (134-144)  


 


Potassium  4.1 mEq/L mEq/L  





   (3.5-5.2)  


 


Chloride  104 mEq/L mEq/L  





   ()  


 


Carbon Dioxide  26 mEq/l mEq/l  





   (22-31)  


 


Anion Gap  8 mEq/L mEq/L  





   (8-16)  


 


BUN  22 mg/dL mg/dL  





   (7-23)  


 


Creatinine  0.7 mg/dL mg/dL  





   (0.6-1.0)  


 


Estimated GFR  > 60   





   


 


Glucose  116 mg/dL H mg/dL  





   ()  


 


Calcium  9.3 mg/dL mg/dL  





   (8.5-10.4)  


 


Total Bilirubin  0.3 mg/dL mg/dL  





   (0.1-1.4)  


 


AST  15 IU/L IU/L  





   (14-46)  


 


ALT  59 IU/L H IU/L  





   (9-52)  


 


Alkaline Phosphatase  34 IU/L L IU/L  





   ()  


 


Total Protein  6.0 g/dL L g/dL  





   (6.3-8.2)  


 


Albumin  3.7 g/dL g/dL  





   (3.5-5.0)  














Departure





- Departure


Disposition: Home, Routine, Self-Care


Clinical Impression: 


 Vasovagal episode, Mast cell disease





Condition: Good


Instructions:  Syncope (ED)


Additional Instructions: 


Please return to the emergency room if your worse in any way.


Referrals: 


Dinorah José MD [[facility] - As per Instructions

## 2017-08-04 NOTE — CPEKG
Heart Rate: 88

RR Interval: 682

P-R Interval: 128

QRSD Interval: 84

QT Interval: 336

QTC Interval: 407

P Axis: 41

QRS Axis: -37

T Wave Axis: 73

EKG Severity - OTHERWISE NORMAL ECG -

EKG Impression: SINUS RHYTHM

EKG Impression: LEFT AXIS DEVIATION

Electronically Signed By: Pura Avendaño 04-Aug-2017 07:16:14

## 2017-08-18 NOTE — CPEKG
Heart Rate: 106

RR Interval: 566

P-R Interval: 112

QRSD Interval: 82

QT Interval: 320

QTC Interval: 425

P Axis: 43

QRS Axis: -35

T Wave Axis: 85

EKG Severity - OTHERWISE NORMAL ECG -

EKG Impression: SINUS TACHYCARDIA

EKG Impression: LEFT AXIS DEVIATION

Electronically Signed By: Byron Barboza 18-Aug-2017 07:08:52

## 2017-08-18 NOTE — EDPHY
H & P


Stated Complaint: SHEKEY FEELINGS AFTER NEW PICC LINE PLACED ON 8/16/17





- Personal History


Tetanus Vaccine Date: Contrainicated by MAST cell disorder





- Medical/Surgical History


Hx Asthma: No


Hx Chronic Respiratory Disease: No


Hx Diabetes: No


Hx Cardiac Disease: No


Hx Renal Disease: No


Hx Cirrhosis: No


Hx Alcoholism: No


Hx HIV/AIDS: No


Hx Splenectomy or Spleen Trauma: No


Other PMH: postural tachycardia syndrome, mast cell dysfunction, postural 

hypotension, allergic to dairy, soy, shellfish, bananas and citrus. Sensitivity 

to light and noise stimulation./duodenitis, seizures





- Social History


Smoking Status: Never smoked


Time Seen by Provider: 08/18/17 06:23


HPI/ROS: 





Chief Complaint:  Tachycardic, shaking, history of mast cell disease





HPI:  37-year-old woman with a history of mast cell disease, seizure disorder, 

Pott's disease and anemia is presenting with sensations of palpitations with 

tachycardia and generalized shaking similar to prior mass cell episodes.  

Patient states she had a new PICC line placed 2 days ago for her continue his 

Benadryl infusion.  At that time she had that needs sutured she had an episode 

of heart rate which increased but this lasted only 5 minutes or so.  She had 

her PICC line flushed by home health and had the sensation of some increasing 

tachycardia and some mild fullness in her face and throat consistent with prior 

anaphylactoid reactions.  That has since improved but she is continuing to have 

just some tachycardia and some generalized shaking which she states is 

consistent with her prior episodes as well. She did give herself a bolus of 

Benadryl at 5 o'clock this morning of 25 mg.  She is normally on a infusion 

rate of 50 milligrams/hour.  She also took 400 mg of ibuprofen.  She is 

chronically on prednisone but is tapering.  She is currently on 55 mg.  Patient 

did have a iron infusion in a transfusion a week or 2 ago.  Labs were last done 

4 days ago and she reports that those were at their baseline.  She denies any 

recent fevers or chills. No chest pain. No shortness of breath.  Is not having 

any pleuritic pain.  No a usual swelling in her right arm where her PICC line 

is.  Not had any redness or discharge. No nausea or vomiting. The patient 

states that she does not feel particularly edematous or swollen at this time.





ROS:  10 point Review of Systems is negative except as noted in the HPI.





PMH:  Mast cell syndrome on a continuous Benadryl infusion


Bus disease


Anemia


Duodenitis


Postural tachycardic syndrome


Partial hypotension


Seizures





Social History: No smoking, no alcohol,  no recreational drug use





Family History: non-contributory





Physical Exam:


Gen: Awake, Alert, No Distress, Cushinoid in appearance


HEENT:  


     Nose: no rhinorrhea


     Eyes: PERRLA, EOMI


     Mouth: Moist mucosa oropharynx is normal


Neck: Supple, no JVD


Chest: nontender, lungs clear to auscultation


Heart: S1, S2 normal, no murmur, tachycardic


Abd: Soft, non-tender, no guarding


Back: no CVA tenderness, no midline tenderness 


Ext:  Mild edema, non-tender, PICC line in her right antecubital fossa.  No 

erythema.


Skin: no rash


Neuro: CN II-XII intact, Sensation grossly intact, Strength 5/5 in bilateral 

upper and lower extremities


 (Byron Barboza)


Constitutional: 


 Initial Vital Signs











Temperature (C)  37.0 C   08/18/17 06:13


 


Heart Rate  119 H  08/18/17 06:13


 


Respiratory Rate  18   08/18/17 06:13


 


Blood Pressure  137/94 H  08/18/17 06:13


 


O2 Sat (%)  94   08/18/17 06:13








 











O2 Delivery Mode               Room Air














Allergies/Adverse Reactions: 


 





metronidazole [From Flagyl] Allergy (Severe, Verified 08/18/17 06:16)


 Other-Enter Comments


acetaminophen [From Vicodin] Allergy (Verified 08/18/17 06:16)


 


aspirin Allergy (Verified 08/18/17 06:16)


 


cyanocobalamin (vitamin B12) Allergy (Verified 08/18/17 06:16)


 


hydrocodone bitartrate [From Vicodin] Allergy (Verified 08/18/17 06:16)


 


levetiracetam [From Keppra] Allergy (Verified 08/18/17 06:16)


 


methylprednisolone [From Solu-Medrol] Allergy (Verified 08/18/17 06:16)


 


morphine Allergy (Verified 08/18/17 06:16)


 








Home Medications: 














 Medication  Instructions  Recorded


 


Ranitidine HCl [Zantac] 150 mg PO TID 01/09/17


 


predniSONE 20 mg PO TID 01/17/17


 


EPINEPHRINE [EPIPEN] 0.3 mg IM PRN PRN 01/21/17


 


Ibuprofen [Motrin (*)] 200 - 400 mg PO Q2HRS PRN 01/21/17


 


diphenhydrAMINE [Benadryl 0 mg IV CONT 03/06/17





Injection]  


 


Potassium Chloride Po [Potassium 20 meq PO DAILY #30 udcup 03/17/17





Chloride 20 mg/15 ml (*)]  


 


Cetirizine [ZyrTEC 10 mg (*)] 10 mg PO BID 06/26/17


 


Cholecalciferol Vit D3 [Vitamin D3 1,000 units PO DAILY 06/26/17





(*)]  


 


Ketotifen 1mg 1 mg PO BID 06/26/17


 


Pantoprazole Sodium [Protonix 40mg 40 mg PO BID 06/26/17





(*)]  


 


Sodium Chloride 0.9% 1000 Ml Bag 0 ml IV CONT 06/26/17














Medical Decision Making





- Diagnostics


EKG Interpretation: 





ECG time 6:45 a.m. sinus tachycardia with a rate of 106.  There is a left axis 

deviation.  There is normal intervals, no acute ST or T-wave changes.  

Impression sinus tachycardia (Byron Barboza)


ED Course/Re-evaluation: 





37-year-old female with symptoms consistent with mass cell disease.  She has a 

mild tachycardia.  She has no significant acute edema at this time.  ECG is 

otherwise unremarkable.  She is already treated herself with Benadryl and 

ibuprofen.  Consultation with her physicians is probably warranted at this 

time.  I have reviewed her laboratory evaluations.  She was anemic when she had 

her blood drawn 4 days ago but this to looks like it is at her baseline.  The 

patient is signed out to Dr. Zafar pending consultation with her physicians 

and re-evaluation. (Byron Barboza)


Other Provider: 





I re-evaluated this patient.  Her labs are normal, she is no longer tachycardic 

and she denies chest pain or shortness of breath.  I spoke with the patient's 

immunologist, Dr. José, by phone at 9am.  She is comfortable with the plan 

of patient going home and doing benadryl boluses as needed.  She will follow as 

an outpatient.  I think given the presence of new PICC line that the worst case 

scenario would be DVT/PE, but given history of no chest pain or shortness of 

breath, and likely reaction to IV contrast, I do not think CTA is indicated at 

this time.   (Luis Zafar)





- Data Points


Laboratory Results: 


 Laboratory Results





 08/18/17 07:25 





 08/18/17 07:25 





 











  08/18/17 08/18/17





  07:25 07:25


 


WBC    9.28 10^3/uL 10^3/uL





    (3.80-9.50) 


 


RBC    3.67 10^6/uL L 10^6/uL





    (4.18-5.33) 


 


Hgb    10.2 g/dL L g/dL





    (12.6-16.3) 


 


Hct    31.2 % L %





    (38.0-47.0) 


 


MCV    85.0 fL fL





    (81.5-99.8) 


 


MCH    27.8 pg L pg





    (27.9-34.1) 


 


MCHC    32.7 g/dL g/dL





    (32.4-36.7) 


 


RDW    18.6 % H %





    (11.5-15.2) 


 


Plt Count    273 10^3/uL 10^3/uL





    (150-400) 


 


MPV    8.4 fL L fL





    (8.7-11.7) 


 


Neut % (Auto)    78.6 % H %





    (39.3-74.2) 


 


Lymph % (Auto)    12.6 % L %





    (15.0-45.0) 


 


Mono % (Auto)    5.7 % %





    (4.5-13.0) 


 


Eos % (Auto)    0.1 % L %





    (0.6-7.6) 


 


Baso % (Auto)    0.1 % L %





    (0.3-1.7) 


 


Nucleat RBC Rel Count    0.4 % H %





    (0.0-0.2) 


 


Absolute Neuts (auto)    7.29 10^3/uL H 10^3/uL





    (1.70-6.50) 


 


Absolute Lymphs (auto)    1.17 10^3/uL 10^3/uL





    (1.00-3.00) 


 


Absolute Monos (auto)    0.53 10^3/uL 10^3/uL





    (0.30-0.80) 


 


Absolute Eos (auto)    0.01 10^3/uL L 10^3/uL





    (0.03-0.40) 


 


Absolute Basos (auto)    0.01 10^3/uL L 10^3/uL





    (0.02-0.10) 


 


Absolute Nucleated RBC    0.04 10^3/uL H 10^3/uL





    (0-0.01) 


 


Immature Gran %    2.9 % H %





    (0.0-1.1) 


 


Immature Gran #    0.27 10^3/uL H 10^3/uL





    (0.00-0.10) 


 


Sodium  138 mEq/L mEq/L  





   (134-144)  


 


Potassium  4.2 mEq/L mEq/L  





   (3.5-5.2)  


 


Chloride  102 mEq/L mEq/L  





   ()  


 


Carbon Dioxide  26 mEq/l mEq/l  





   (22-31)  


 


Anion Gap  10 mEq/L mEq/L  





   (8-16)  


 


BUN  23 mg/dL mg/dL  





   (7-23)  


 


Creatinine  0.6 mg/dL mg/dL  





   (0.6-1.0)  


 


Estimated GFR  > 60   





   


 


Glucose  112 mg/dL H mg/dL  





   ()  


 


Calcium  9.5 mg/dL mg/dL  





   (8.5-10.4)  


 


Troponin I  < 0.012 ng/mL ng/mL  





   (0.000-0.034)  











Medications Given: 


 








Discontinued Medications





Sodium Chloride (Ns)  500 mls @ 0 mls/hr IV EDNOW ONE; Wide [facility]


   PRN Reason: Protocol


   Stop: 08/18/17 06:52


   Last Admin: 08/18/17 07:30 Dose:  500 mls








Departure





- Departure


Disposition: Home, Routine, Self-Care


Clinical Impression: 


 Mast cell disease, Tachycardia





Condition: Good


Instructions:  Additional Information


Additional Instructions: 


Follow-up with Dr. José as needed.  Return to the ED for chest pain, 

shortness of breath or other concerns. 


Referrals: 


Jason Bianchi MD [Primary Care Provider] - As per Instructions

## 2017-11-16 ENCOUNTER — HOSPITAL ENCOUNTER (OUTPATIENT)
Dept: HOSPITAL 80 - FIMAGING | Age: 37
Discharge: HOME | End: 2017-11-16
Attending: ALLERGY & IMMUNOLOGY
Payer: MEDICAID

## 2017-11-16 DIAGNOSIS — D89.40: Primary | ICD-10-CM

## 2017-11-16 PROCEDURE — 02HV33Z INSERTION OF INFUSION DEVICE INTO SUPERIOR VENA CAVA, PERCUTANEOUS APPROACH: ICD-10-PCS | Performed by: RADIOLOGY

## 2017-11-16 PROCEDURE — 77001 FLUOROGUIDE FOR VEIN DEVICE: CPT

## 2017-11-16 PROCEDURE — C1751 CATH, INF, PER/CENT/MIDLINE: HCPCS

## 2017-11-16 PROCEDURE — 36569 INSJ PICC 5 YR+ W/O IMAGING: CPT

## 2018-07-02 ENCOUNTER — HOSPITAL ENCOUNTER (OUTPATIENT)
Dept: HOSPITAL 80 - FIMAGING | Age: 38
Discharge: HOME | End: 2018-07-02
Attending: ALLERGY & IMMUNOLOGY
Payer: MEDICAID

## 2018-07-02 DIAGNOSIS — D89.40: Primary | ICD-10-CM

## 2018-07-02 PROCEDURE — 02HV33Z INSERTION OF INFUSION DEVICE INTO SUPERIOR VENA CAVA, PERCUTANEOUS APPROACH: ICD-10-PCS

## 2018-07-02 PROCEDURE — 36569 INSJ PICC 5 YR+ W/O IMAGING: CPT

## 2018-07-02 PROCEDURE — 77001 FLUOROGUIDE FOR VEIN DEVICE: CPT

## 2018-07-02 PROCEDURE — C1751 CATH, INF, PER/CENT/MIDLINE: HCPCS

## 2018-10-18 NOTE — HOSPPROG
Hospitalist Progress Note


Assessment/Plan: 





# severe gastoduodenitis/post-prandial abd pain - bx with significant mast cells


   - cont ppi, BMX


# weakness/deconditioning - slowly improving; more aggressive activity today


   - still unsafe while ambulating


# hypoK - resolved


# severe protein calorie malnutrition


# MCAS - path from bx consistent with MCAS


   - cont benadryl gtt, BMX pre-prandial, zyrtec


   - tapering pred


# low vit D - ergo given yesterday


##


path reviewed





Subjective: did not eat very well last night;  stood up on her own today


Objective: 


 Vital Signs











Temp Pulse Resp BP Pulse Ox


 


 36.3 C   90   18   105/78   97 


 


 03/11/17 07:23  03/11/17 07:23  03/11/17 07:23  03/11/17 07:23  03/11/17 07:23








 Laboratory Results





 03/09/17 05:20 





 03/11/17 03:22 





 











 03/10/17 03/11/17 03/12/17





 05:59 05:59 06:59


 


Intake Total 1150 2113 


 


Balance 1150 2113 








Vitals reviewed


Pleasant, no acute distress; Cachectic


 tachycardic, no murmurs rubs or gallops


No respiratory distress, lungs clear to auscultation bilaterally, no wheezes or 

rales


Abdomen soft nontender, nondistended, no hepatosplenomegaly





ICD10 Worksheet


Patient Problems: 


 Problems











Problem Status Onset


 


Allergic reaction Acute  


 


Mast cell disease Acute  


 


Anaphylaxis Acute  


 


Hypokalemia Acute  


 


Anorexia Acute  


 


Cachexia Acute  


 


Electrolyte abnormality Acute No lymphadedenopathy

## 2019-01-19 ENCOUNTER — HOSPITAL ENCOUNTER (EMERGENCY)
Dept: HOSPITAL 80 - FED | Age: 39
Discharge: HOME | End: 2019-01-19
Payer: MEDICAID

## 2019-01-19 VITALS — SYSTOLIC BLOOD PRESSURE: 122 MMHG | DIASTOLIC BLOOD PRESSURE: 76 MMHG

## 2019-01-19 DIAGNOSIS — T82.594A: Primary | ICD-10-CM
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
## 2019-01-19 PROCEDURE — 99285 EMERGENCY DEPT VISIT HI MDM: CPT

## 2019-01-19 PROCEDURE — 77001 FLUOROGUIDE FOR VEIN DEVICE: CPT

## 2019-01-19 PROCEDURE — C1751 CATH, INF, PER/CENT/MIDLINE: HCPCS

## 2019-01-19 NOTE — EDPHY
H & P


Stated Complaint: PICC line leaking


Time Seen by Provider: 01/19/19 07:33


HPI/ROS: 


CHIEF COMPLAINT:  PICC line broke





HISTORY OF PRESENT ILLNESS:  Patient is a 38-year-old female with a history of 

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  She has 

a PICC line in her right arm that is been present for 6-7 months while she 

receives continuous IV Benadryl at 15 milligrams/hour.  This morning she woke 

up and noticed that the line was leaking.  The side of the tube has a small 

crack.  She otherwise is asymptomatic.  She turned it off and clamped it and 

did take an oral Benadryl at home before coming.  No fevers.  No GI symptoms.  

No hives.  No difficulty breathing.


Severity:  Moderate


Modifying factors:  None





REVIEW OF SYSTEMS:


Constitutional:  denies: chills, fever, recent illness, recent injury


EENTM: denies: blurred vision, double vision, nose congestion


Respiratory: denies: cough, shortness of breath


Cardiac: denies: chest pain, irregular heart rate, lightheadedness, palpitations


Gastrointestinal/Abdominal: denies: abdominal pain, diarrhea, nausea, vomiting, 

blood streaked stools


Genitourinary: denies: dysuria, frequency, hematuria, pain


Musculoskeletal: denies: joint pain, muscle pain


Skin: denies: lesions, rash, jaundice, bruising


Neurological: denies: headache, numbness, paresthesia, tingling, dizziness, 

weakness


Hematologic/Lymphatic: denies: blood clots, easy bleeding, easy bruising


Immunologic/allergic: denies: HIV/AIDS, transplant


 10 systems reviewed and negative except as noted





EXAM:


GENERAL:  Well-appearing, well-nourished and in no acute distress.  Pale


HEAD:  Atraumatic, normocephalic.


EYES:  Pupils equal round and reactive to light, extraocular movements intact, 

sclera anicteric, conjunctiva are normal.


ENT:  TMs normal, nares patent, oropharynx clear without exudates.  Moist 

mucous membranes.


NECK:  Normal range of motion, supple without lymphadenopathy or JVD.


LUNGS:  Breath sounds clear to auscultation bilaterally and equal.  No wheezes 

rales or rhonchi.


HEART:  Regular rate and rhythm without murmurs, rubs or gallops.


ABDOMEN:  Soft, nontender, normoactive bowel sounds.  No guarding, no rebound.  

No masses appreciated. 


BACK:  No CVA tenderness, no spinal tenderness, step-offs or deformities


EXTREMITIES:  Normal range of motion, no pitting or edema.  No clubbing or 

cyanosis.


NEUROLOGICAL:  Cranial nerves II through XII grossly intact.  Normal speech, 

normal gait.  5/5 strength, normal movement in all extremities, normal sensation

, normal reflexes


PSYCH:  Normal mood, normal affect.


SKIN:  Warm, dry, normal turgor, no visible rashes or lesions.  No sign of 

erythema or tenderness around the PICC line.











Source: Patient


Exam Limitations: No limitations





- Personal History


LMP (Females 10-55): 8-14 Days Ago


Current Tetanus/Diphtheria Vaccine: Yes


Tetanus Vaccine Date: Contrainicated by MAST cell disorder





- Medical/Surgical History


Hx Asthma: No


Hx Chronic Respiratory Disease: No


Hx Diabetes: No


Hx Cardiac Disease: No


Hx Renal Disease: No


Hx Cirrhosis: No


Hx Alcoholism: No


Hx HIV/AIDS: No


Hx Splenectomy or Spleen Trauma: No


Other PMH: postural tachycardia syndrome, mast cell dysfunction, postural 

hypotension, allergic to dairy, soy, shellfish, bananas and citrus. Sensitivity 

to light and noise stimulation./duodenitis, seizures





- Social History


Smoking Status: Never smoked


Alcohol Use: None


Constitutional: 


 Initial Vital Signs











Temperature (C)  36.8 C   01/19/19 07:24


 


Heart Rate  112 H  01/19/19 07:24


 


Respiratory Rate  18   01/19/19 07:24


 


Blood Pressure  129/74 H  01/19/19 07:24


 


O2 Sat (%)  100   01/19/19 07:24








 











O2 Delivery Mode               Room Air


 


O2 (L/minute)                  2














Allergies/Adverse Reactions: 


 





metronidazole [From Flagyl] Allergy (Severe, Verified 01/19/19 07:26)


 Other-Enter Comments


famotidine Allergy (Unknown, Unverified 01/19/19 07:26)


 


lidocaine Allergy (Unknown, Unverified 01/19/19 07:26)


 


aspirin Allergy (Verified 01/19/19 07:26)


 


cyanocobalamin (vitamin B12) Allergy (Verified 01/19/19 07:26)


 


hydrocodone bitartrate [From Vicodin] Allergy (Verified 01/19/19 07:26)


 


levetiracetam [From Keppra] Allergy (Verified 01/19/19 07:26)


 


methylprednisolone [From Solu-Medrol] Allergy (Verified 01/19/19 07:26)


 


morphine Allergy (Verified 01/19/19 07:26)


 


ASA Allergy (Unknown, Uncoded 01/19/19 07:26)


 


Lorcet (hydrocodone) Allergy (Unknown, Uncoded 01/19/19 07:26)


 


Solumedrol Allergy (Unknown, Uncoded 01/19/19 07:26)


 


Vitamin B 12 injectable Allergy (Unknown, Uncoded 01/19/19 07:26)


 








Home Medications: 














 Medication  Instructions  Recorded


 


Ranitidine HCl [Zantac] 150 mg PO TID 01/09/17


 


predniSONE 20 mg PO TID 01/17/17


 


EPINEPHRINE [EPIPEN] 0.3 mg IM PRN PRN 01/21/17


 


Ibuprofen [Motrin (*)] 200 - 400 mg PO Q2HRS PRN 01/21/17


 


diphenhydrAMINE [Benadryl 0 mg IV CONT 03/06/17





Injection]  


 


Potassium Chloride Po [Potassium 20 meq PO DAILY #30 udcup 03/17/17





Chloride 20 mg/15 ml (*)]  


 


Cetirizine [ZyrTEC 10 mg (*)] 10 mg PO BID 06/26/17


 


Cholecalciferol Vit D3 [Vitamin D3 1,000 units PO DAILY 06/26/17





(*)]  


 


Ketotifen 1mg 1 mg PO BID 06/26/17


 


Pantoprazole Sodium [Protonix 40mg 40 mg PO BID 06/26/17





(*)]  


 


Sodium Chloride 0.9% 1000 Ml Bag 0 ml IV CONT 06/26/17














Medical Decision Making





- Diagnostics


Imaging Results: 


 Imaging Impressions





PICC Line Insertion  01/19/19 07:41


Impression: 4 French single lumen peripherally inserted central catheter is 

ready to use.


 


 











Imaging: Discussed imaging studies w/ On call Radiologist


ED Course/Re-evaluation: 





We have called the PICC line team in for PICC line replacement.  In the 

meantime will start a peripheral IV and continue her Benadryl drip.








7:50 a.m. I spoke with Dr. Hartmann who will plan to place the PICC line.





11:15 a.m. The patient has returned in her PICC line is working.  She is eager 

to go.  Discussed indications for returning.


Differential Diagnosis: 





Partial list of the Differential diagnosis considered include but were not 

limited to;  PICC line replacement, malfunction and although unlikely based on 

the history and physical exam, I also considered infection, DVT.  I discussed 

these differential diagnoses and the plan with the patient as well as the usual 

and expected course.  The patient understands that the diagnosis is provisional 

and that in medicine we are not always correct and that further workup is often 

warranted.  Usual and customary warnings were given.  All of the patient's 

questions were answered.  The patient was instructed to return to the emergency 

department should the symptoms at all worsen or return, otherwise to followup 

with the physician as we discussed.





Departure





- Departure


Disposition: Home, Routine, Self-Care


Clinical Impression: 


 Status post PICC central line placement





Condition: Good


Instructions:  Peripherally Inserted Central Catheters and Midline Catheters 

in... (DC)


Referrals: 


NONE *PRIMARY CARE P,. [Primary Care Provider] - As per Instructions


Dinorah José MD [[facility] [place] Primary Care Provider] - As per Instructions